# Patient Record
Sex: MALE | Race: WHITE | NOT HISPANIC OR LATINO | Employment: OTHER | URBAN - NONMETROPOLITAN AREA
[De-identification: names, ages, dates, MRNs, and addresses within clinical notes are randomized per-mention and may not be internally consistent; named-entity substitution may affect disease eponyms.]

---

## 2017-04-20 ENCOUNTER — OUTSIDE FACILITY SERVICE (OUTPATIENT)
Dept: CARDIOLOGY | Facility: CLINIC | Age: 57
End: 2017-04-20

## 2017-04-20 PROCEDURE — 99255 IP/OBS CONSLTJ NEW/EST HI 80: CPT | Performed by: INTERNAL MEDICINE

## 2017-04-20 PROCEDURE — 93306 TTE W/DOPPLER COMPLETE: CPT | Performed by: INTERNAL MEDICINE

## 2017-04-21 ENCOUNTER — OUTSIDE FACILITY SERVICE (OUTPATIENT)
Dept: CARDIOLOGY | Facility: CLINIC | Age: 57
End: 2017-04-21

## 2017-04-21 PROCEDURE — 93458 L HRT ARTERY/VENTRICLE ANGIO: CPT | Performed by: INTERNAL MEDICINE

## 2017-04-22 ENCOUNTER — OUTSIDE FACILITY SERVICE (OUTPATIENT)
Dept: CARDIOLOGY | Facility: CLINIC | Age: 57
End: 2017-04-22

## 2017-04-22 PROCEDURE — 99232 SBSQ HOSP IP/OBS MODERATE 35: CPT | Performed by: INTERNAL MEDICINE

## 2017-04-24 ENCOUNTER — TELEPHONE (OUTPATIENT)
Dept: CARDIOLOGY | Facility: CLINIC | Age: 57
End: 2017-04-24

## 2017-05-10 ENCOUNTER — OFFICE VISIT (OUTPATIENT)
Dept: CARDIOLOGY | Facility: CLINIC | Age: 57
End: 2017-05-10

## 2017-05-10 VITALS
HEIGHT: 72 IN | SYSTOLIC BLOOD PRESSURE: 140 MMHG | DIASTOLIC BLOOD PRESSURE: 98 MMHG | WEIGHT: 227 LBS | BODY MASS INDEX: 30.75 KG/M2 | HEART RATE: 64 BPM

## 2017-05-10 DIAGNOSIS — I25.9 IHD (ISCHEMIC HEART DISEASE): Primary | ICD-10-CM

## 2017-05-10 DIAGNOSIS — I10 ESSENTIAL HYPERTENSION: ICD-10-CM

## 2017-05-10 DIAGNOSIS — R06.02 SHORTNESS OF BREATH: ICD-10-CM

## 2017-05-10 DIAGNOSIS — M06.9 RHEUMATOID ARTHRITIS INVOLVING MULTIPLE SITES, UNSPECIFIED RHEUMATOID FACTOR PRESENCE: ICD-10-CM

## 2017-05-10 DIAGNOSIS — E78.00 HYPERCHOLESTEREMIA: ICD-10-CM

## 2017-05-10 DIAGNOSIS — E88.81 METABOLIC SYNDROME: ICD-10-CM

## 2017-05-10 PROCEDURE — 99214 OFFICE O/P EST MOD 30 MIN: CPT | Performed by: INTERNAL MEDICINE

## 2017-05-10 PROCEDURE — 93000 ELECTROCARDIOGRAM COMPLETE: CPT | Performed by: INTERNAL MEDICINE

## 2017-05-10 RX ORDER — PANTOPRAZOLE SODIUM 40 MG/1
40 TABLET, DELAYED RELEASE ORAL DAILY
COMMUNITY
End: 2018-04-12 | Stop reason: SDUPTHER

## 2017-05-10 RX ORDER — ATORVASTATIN CALCIUM 20 MG/1
20 TABLET, FILM COATED ORAL DAILY
COMMUNITY
End: 2017-05-10 | Stop reason: SDUPTHER

## 2017-05-10 RX ORDER — ATORVASTATIN CALCIUM 20 MG/1
20 TABLET, FILM COATED ORAL DAILY
Qty: 30 TABLET | Refills: 8 | Status: SHIPPED | OUTPATIENT
Start: 2017-05-10 | End: 2018-02-12 | Stop reason: SDUPTHER

## 2017-05-10 RX ORDER — ASPIRIN 81 MG/1
81 TABLET ORAL DAILY
COMMUNITY
End: 2017-05-16 | Stop reason: SDUPTHER

## 2017-05-10 RX ORDER — PRASUGREL 10 MG/1
10 TABLET, FILM COATED ORAL DAILY
Qty: 30 TABLET | Refills: 8 | Status: SHIPPED | OUTPATIENT
Start: 2017-05-10 | End: 2018-02-09 | Stop reason: SDUPTHER

## 2017-05-10 RX ORDER — DEXAMETHASONE 4 MG/1
4 TABLET ORAL 2 TIMES DAILY WITH MEALS
COMMUNITY
End: 2017-10-12

## 2017-05-10 RX ORDER — RANOLAZINE 500 MG/1
500 TABLET, EXTENDED RELEASE ORAL 2 TIMES DAILY
Qty: 30 TABLET | Refills: 1 | COMMUNITY
Start: 2017-05-10 | End: 2017-07-11 | Stop reason: HOSPADM

## 2017-05-10 RX ORDER — PRASUGREL 10 MG/1
10 TABLET, FILM COATED ORAL DAILY
COMMUNITY
End: 2017-05-10 | Stop reason: SDUPTHER

## 2017-05-16 ENCOUNTER — TELEPHONE (OUTPATIENT)
Dept: CARDIOLOGY | Facility: CLINIC | Age: 57
End: 2017-05-16

## 2017-05-16 RX ORDER — ASPIRIN 81 MG/1
81 TABLET ORAL DAILY
Qty: 90 TABLET | Refills: 0 | Status: SHIPPED | OUTPATIENT
Start: 2017-05-16 | End: 2017-08-14 | Stop reason: SDUPTHER

## 2017-05-19 ENCOUNTER — OUTSIDE FACILITY SERVICE (OUTPATIENT)
Dept: CARDIOLOGY | Facility: CLINIC | Age: 57
End: 2017-05-19

## 2017-05-19 PROCEDURE — 93458 L HRT ARTERY/VENTRICLE ANGIO: CPT | Performed by: INTERNAL MEDICINE

## 2017-05-20 ENCOUNTER — OUTSIDE FACILITY SERVICE (OUTPATIENT)
Dept: CARDIOLOGY | Facility: CLINIC | Age: 57
End: 2017-05-20

## 2017-05-20 PROCEDURE — 99217 PR OBSERVATION CARE DISCHARGE MANAGEMENT: CPT | Performed by: INTERNAL MEDICINE

## 2017-06-15 ENCOUNTER — TELEPHONE (OUTPATIENT)
Dept: CARDIOLOGY | Facility: CLINIC | Age: 57
End: 2017-06-15

## 2017-06-15 RX ORDER — LISINOPRIL 10 MG/1
10 TABLET ORAL DAILY
Qty: 30 TABLET | Refills: 11 | Status: SHIPPED | OUTPATIENT
Start: 2017-06-15 | End: 2018-04-12 | Stop reason: SDUPTHER

## 2017-06-15 NOTE — TELEPHONE ENCOUNTER
Received call from patient's spouse requesting refill on Lisinopril 10mg daily, states patient was started on this medication after heart cath in April and has been taking it daily, patient did not report taking this medication on last office visit and is not on med list, is it okay to refill? Thank you.

## 2017-06-26 ENCOUNTER — TELEPHONE (OUTPATIENT)
Dept: CARDIOLOGY | Facility: CLINIC | Age: 57
End: 2017-06-26

## 2017-06-26 DIAGNOSIS — I97.410 FEMORAL ARTERY HEMATOMA COMPLICATING CARDIAC CATHETERIZATION: Primary | ICD-10-CM

## 2017-06-26 NOTE — TELEPHONE ENCOUNTER
Pt's wife called, pt had cath 5/19/17, cath site had been fine until the past 2-3 days.  Now has reddened area about 3-4 inch diameter around cath site. Soreness noted. No drainage or fever.

## 2017-06-27 NOTE — TELEPHONE ENCOUNTER
Called pt, he said he wife had overreacted.  That the area looked much better today, that he had been sweating and felt like his clothes had rubbed the area. He didn't feel any further testing was needed.  Advised him to continue to moniter and contact us if he feels there is any problem. Will cancel US order for now.

## 2017-07-11 ENCOUNTER — OFFICE VISIT (OUTPATIENT)
Dept: CARDIOLOGY | Facility: CLINIC | Age: 57
End: 2017-07-11

## 2017-07-11 VITALS — WEIGHT: 242 LBS | HEIGHT: 72 IN | BODY MASS INDEX: 32.78 KG/M2

## 2017-07-11 DIAGNOSIS — R07.89 CHEST PRESSURE: ICD-10-CM

## 2017-07-11 DIAGNOSIS — I10 ESSENTIAL HYPERTENSION: ICD-10-CM

## 2017-07-11 DIAGNOSIS — E78.00 HYPERCHOLESTEREMIA: ICD-10-CM

## 2017-07-11 DIAGNOSIS — M05.79 RHEUMATOID ARTHRITIS INVOLVING MULTIPLE SITES WITH POSITIVE RHEUMATOID FACTOR (HCC): ICD-10-CM

## 2017-07-11 DIAGNOSIS — I25.9 IHD (ISCHEMIC HEART DISEASE): Primary | ICD-10-CM

## 2017-07-11 PROCEDURE — 99214 OFFICE O/P EST MOD 30 MIN: CPT | Performed by: INTERNAL MEDICINE

## 2017-07-11 RX ORDER — NEBIVOLOL 5 MG/1
TABLET ORAL
COMMUNITY
End: 2018-04-12 | Stop reason: SDUPTHER

## 2017-07-11 RX ORDER — OXYCODONE HYDROCHLORIDE AND ACETAMINOPHEN 5; 325 MG/1; MG/1
1 TABLET ORAL EVERY 6 HOURS PRN
COMMUNITY
End: 2017-10-12 | Stop reason: ALTCHOICE

## 2017-07-11 RX ORDER — GABAPENTIN 100 MG/1
CAPSULE ORAL
COMMUNITY
End: 2018-04-12 | Stop reason: ALTCHOICE

## 2017-07-11 NOTE — PROGRESS NOTES
Chief Complaint   Patient presents with   • Other     hospital follow-up, patient had heart Cath 5/19/17 with stenting to RAMUS x 3, patient recently diagnosed with shingles on chest   • Chest Pain     sharp burning pain, states does not know if it is his shingles or not   • Shortness of Breath     at times states may be related to shingles       CARDIAC COMPLAINTS  chest pressure/discomfort and fatigue        Subjective   Luis Miguel Haley is a 57 y.o. male came today for his follow-up visit.  He has history of ischemic heart disease diagnosed in April and underwent multiple stenting on almost all his coronaries.  He does have significant rheumatoid arthritis, with possible extra-articular involvement.  He apparently developed shingles last month, and is not able to take any of his rheumatoid medications.  He also is not on any antiviral medication.  The shingles happened on his chest, and his been having some chest pain which is burning in nature.  He is not sure whether this is cardiac related shingles related.  He doesn't have much rashes at this time.              Cardiac History  Past Surgical History:   Procedure Laterality Date   • CARDIAC CATHETERIZATION  04/21/2017    85% LAD- 2.5x30,2.5x30,2.5x22 Resolute. 85% RCA- 3.5x38,3.5x18,2.25x12 Resolute. 75% Ramus.   • CARDIAC CATHETERIZATION  05/19/2017    90% Ramus- 2.25x12,2.25x16,2.25x8 Promus   • CARDIOVASCULAR STRESS TEST  04/19/2017    @[facility]. R.Stress- 7 Min, 62% THR. BP- 180/80. Borderline test   • ECHO - CONVERTED  04/20/2017    @[facility]- EF 65%. AV nodular Thickenning       Current Outpatient Prescriptions   Medication Sig Dispense Refill   • aspirin 81 MG EC tablet Take 1 tablet by mouth Daily. 90 tablet 0   • atorvastatin (LIPITOR) 20 MG tablet Take 1 tablet by mouth Daily. 30 tablet 8   • dexamethasone (DECADRON) 4 MG tablet Take 4 mg by mouth 2 (Two) Times a Day With Meals.     • gabapentin (NEURONTIN) 100 MG capsule Take 100 mg by mouth 3 (Three) Times a  Day.     • lisinopril (PRINIVIL,ZESTRIL) 10 MG tablet Take 1 tablet by mouth Daily. 30 tablet 11   • nebivolol (BYSTOLIC) 5 MG tablet Take 1/2 tablet by mouth daily     • oxyCODONE-acetaminophen (PERCOCET) 5-325 MG per tablet Take 1 tablet by mouth Every 6 (Six) Hours As Needed.     • pantoprazole (PROTONIX) 40 MG EC tablet Take 40 mg by mouth Daily.     • prasugrel (EFFIENT) 10 MG tablet Take 1 tablet by mouth Daily. 30 tablet 8   • riTUXimab (RITUXAN) 100 MG/10ML solution injection intraveniously every 6 months       No current facility-administered medications for this visit.        Allergies  :  Methotrexate derivatives and Orencia [abatacept]       Past Medical History:   Diagnosis Date   • Coronary artery disease    • H/O hernia repair    • Heart murmur    • Hypercholesteremia 5/10/2017   • Rheumatoid arthritis        Social History     Social History   • Marital status:      Spouse name: N/A   • Number of children: N/A   • Years of education: N/A     Occupational History   • Not on file.     Social History Main Topics   • Smoking status: Former Smoker   • Smokeless tobacco: Never Used      Comment: quit 35 years ago   • Alcohol use Yes      Comment: rarely   • Drug use: No   • Sexual activity: Not on file     Other Topics Concern   • Not on file     Social History Narrative       Family History   Problem Relation Age of Onset   • Diabetes Mother    • Cancer Father    • Hypertension Sister        Review of Systems   Constitution: Positive for weakness and malaise/fatigue. Negative for decreased appetite.   HENT: Negative for congestion and sore throat.    Eyes: Negative for blurred vision.   Cardiovascular: Negative for dyspnea on exertion and palpitations.   Respiratory: Negative for shortness of breath and snoring.    Endocrine: Negative for cold intolerance and heat intolerance.   Hematologic/Lymphatic: Negative for adenopathy. Does not bruise/bleed easily.   Skin: Positive for rash. Negative for  "itching, nail changes and skin cancer.   Musculoskeletal: Positive for arthritis and myalgias.   Gastrointestinal: Negative for abdominal pain, dysphagia and heartburn.   Genitourinary: Negative for bladder incontinence and frequency.   Neurological: Negative for dizziness, light-headedness, seizures and vertigo.   Psychiatric/Behavioral: Negative for altered mental status.   Allergic/Immunologic: Negative for environmental allergies and hives.       Diabetes- No  Thyroid- normal    Objective     Ht 72\" (182.9 cm)  Wt 242 lb (110 kg)  BMI 32.82 kg/m2    Physical Exam   Constitutional: He is oriented to person, place, and time. He appears well-nourished.   HENT:   Head: Normocephalic.   Eyes: Pupils are equal, round, and reactive to light.   Neck: Normal range of motion.   Cardiovascular: Normal rate, regular rhythm, S1 normal and S2 normal.    Murmur heard.  Pulmonary/Chest: Breath sounds normal.   Abdominal: Soft. Bowel sounds are normal.   Musculoskeletal: Normal range of motion.   Neurological: He is alert and oriented to person, place, and time.   Skin: Skin is warm.   Psychiatric: He has a normal mood and affect.     Procedures            Assessment/Plan     Luis Miguel was seen today for other, chest pain and shortness of breath.    Diagnoses and all orders for this visit:    IHD (ischemic heart disease)  -     Stress Test With Myocardial Perfusion One Day; Future    Essential hypertension    Hypercholesteremia    Rheumatoid arthritis involving multiple sites with positive rheumatoid factor    Chest pressure  -     Stress Test With Myocardial Perfusion One Day; Future     His heart rate and blood pressure appears stable.  He has gained about 15 pounds.  This could be related to the prednisone intake.  I had a long talk with him about his weight gain.  At this time continue the same medications.  I'm going to do a Lexiscan Myoview to make sure that this is not ischemic cause for chest pain.  I advised him to talk " you regarding the shingles.  If the stress test shows significant ischemia, then we may have to repeat his cardiac catheterization.  Based on the results, further recommendations will be made.                    Electronically signed by Millie Rueda MD July 11, 2017 4:13 PM

## 2017-07-25 ENCOUNTER — HOSPITAL ENCOUNTER (OUTPATIENT)
Dept: CARDIOLOGY | Facility: HOSPITAL | Age: 57
Discharge: HOME | End: 2017-07-25
Attending: INTERNAL MEDICINE

## 2017-07-25 ENCOUNTER — OUTSIDE FACILITY SERVICE (OUTPATIENT)
Dept: CARDIOLOGY | Facility: CLINIC | Age: 57
End: 2017-07-25

## 2017-07-25 DIAGNOSIS — I25.9 IHD (ISCHEMIC HEART DISEASE): ICD-10-CM

## 2017-07-25 DIAGNOSIS — R07.89 CHEST PRESSURE: ICD-10-CM

## 2017-07-25 LAB
MAXIMAL PREDICTED HEART RATE: 164 BPM
STRESS TARGET HR: 139 BPM

## 2017-07-25 PROCEDURE — 25010000002 REGADENOSON 0.4 MG/5ML SOLUTION: Performed by: INTERNAL MEDICINE

## 2017-07-25 PROCEDURE — 93017 CV STRESS TEST TRACING ONLY: CPT

## 2017-07-25 PROCEDURE — 0 TECHNETIUM SESTAMIBI: Performed by: INTERNAL MEDICINE

## 2017-07-25 PROCEDURE — A9500 TC99M SESTAMIBI: HCPCS | Performed by: INTERNAL MEDICINE

## 2017-07-25 PROCEDURE — 78452 HT MUSCLE IMAGE SPECT MULT: CPT

## 2017-07-25 PROCEDURE — 93018 CV STRESS TEST I&R ONLY: CPT | Performed by: INTERNAL MEDICINE

## 2017-07-25 PROCEDURE — 78452 HT MUSCLE IMAGE SPECT MULT: CPT | Performed by: INTERNAL MEDICINE

## 2017-07-25 RX ADMIN — TECHNETIUM TC-99M SESTAMIBI 1 DOSE: 1 INJECTION INTRAVENOUS at 09:00

## 2017-07-25 RX ADMIN — REGADENOSON 0.4 MG: 0.08 INJECTION, SOLUTION INTRAVENOUS at 09:00

## 2017-07-27 ENCOUNTER — TELEPHONE (OUTPATIENT)
Dept: CARDIOLOGY | Facility: CLINIC | Age: 57
End: 2017-07-27

## 2017-07-27 NOTE — TELEPHONE ENCOUNTER
I spoke with patient's wife, Pham.  Aware of stress test results and recommendations.  No ischemia, and Continue home medications.

## 2017-08-14 RX ORDER — ASPIRIN 81 MG
TABLET, DELAYED RELEASE (ENTERIC COATED) ORAL
Qty: 30 TABLET | Refills: 0 | Status: SHIPPED | OUTPATIENT
Start: 2017-08-14 | End: 2017-11-09 | Stop reason: SDUPTHER

## 2017-10-12 ENCOUNTER — OFFICE VISIT (OUTPATIENT)
Dept: CARDIOLOGY | Facility: CLINIC | Age: 57
End: 2017-10-12

## 2017-10-12 VITALS
HEART RATE: 68 BPM | BODY MASS INDEX: 33.05 KG/M2 | SYSTOLIC BLOOD PRESSURE: 120 MMHG | HEIGHT: 72 IN | DIASTOLIC BLOOD PRESSURE: 80 MMHG | WEIGHT: 244 LBS

## 2017-10-12 DIAGNOSIS — G62.9 NEUROPATHY: ICD-10-CM

## 2017-10-12 DIAGNOSIS — I25.9 IHD (ISCHEMIC HEART DISEASE): Primary | ICD-10-CM

## 2017-10-12 DIAGNOSIS — I10 ESSENTIAL HYPERTENSION: ICD-10-CM

## 2017-10-12 DIAGNOSIS — R06.02 SHORTNESS OF BREATH: ICD-10-CM

## 2017-10-12 DIAGNOSIS — M05.79 RHEUMATOID ARTHRITIS INVOLVING MULTIPLE SITES WITH POSITIVE RHEUMATOID FACTOR (HCC): ICD-10-CM

## 2017-10-12 DIAGNOSIS — R07.89 CHEST PRESSURE: ICD-10-CM

## 2017-10-12 DIAGNOSIS — E78.00 HYPERCHOLESTEREMIA: ICD-10-CM

## 2017-10-12 PROCEDURE — 99213 OFFICE O/P EST LOW 20 MIN: CPT | Performed by: INTERNAL MEDICINE

## 2017-10-12 RX ORDER — NITROGLYCERIN 0.4 MG/1
TABLET SUBLINGUAL
Qty: 100 TABLET | Refills: 11 | Status: SHIPPED | OUTPATIENT
Start: 2017-10-12 | End: 2018-04-12 | Stop reason: SDUPTHER

## 2017-10-12 RX ORDER — FUROSEMIDE 20 MG/1
20 TABLET ORAL DAILY
COMMUNITY
End: 2018-04-12 | Stop reason: SDUPTHER

## 2017-10-12 RX ORDER — CHOLECALCIFEROL (VITAMIN D3) 125 MCG
1 CAPSULE ORAL DAILY
COMMUNITY

## 2017-10-12 RX ORDER — POTASSIUM CHLORIDE 600 MG/1
8 CAPSULE, EXTENDED RELEASE ORAL DAILY
COMMUNITY
End: 2018-04-12 | Stop reason: SDUPTHER

## 2017-10-12 RX ORDER — LANOLIN ALCOHOL/MO/W.PET/CERES
1000 CREAM (GRAM) TOPICAL DAILY
COMMUNITY

## 2017-10-12 NOTE — PROGRESS NOTES
Chief Complaint   Patient presents with   • Follow-up     cardiac management, labs per PCP, patient brought medication's with visit.    • Shortness of Breath     with exertion       CARDIAC COMPLAINTS  chest pressure/discomfort and dyspnea        Subjective   Luis Miguel Haley is a 57 y.o. male came in today for his follow-up visit .  He has hypertension , hypercholesterolemia and severe rheumatoid arthritis .  He was diagnosed with ischemic heart disease in April of this year when he underwent multiple stenting of the LAD and RCA .  Then about a month later he underwent stenting of the ramus.  He unfortunately developed shingles and has some chest pain also.  It was difficult to determine which one his musculoskeletal and which one his cardiac.  He underwent Lexiscan Myoview and found to have mostly and small inferior infarct within normal flow in other segments.  He has been managed medically.  He came today stating that recently he had a CT scan because of increasing shortness of breath .  According to him all of the workup was negative .  His chest pain is little better .  But is getting neuropathy after the shingles .  He now takes Neurontin for the same .  He did have lab work done at your office few days ago , but I don't have the results.                 Cardiac History  Past Surgical History:   Procedure Laterality Date   • CARDIAC CATHETERIZATION  04/21/2017    85% LAD- 2.5x30,2.5x30,2.5x22 Resolute. 85% RCA- 3.5x38,3.5x18,2.25x12 Resolute. 75% Ramus.   • CARDIAC CATHETERIZATION  05/19/2017    90% Ramus- 2.25x12,2.25x16,2.25x8 Promus   • CARDIOVASCULAR STRESS TEST  04/19/2017    @[facility]. R.Stress- 7 Min, 62% THR. BP- 180/80. Borderline test   • CARDIOVASCULAR STRESS TEST  07/25/2017    L.Myoview- EF 50%. Inferior Infarct   • ECHO - CONVERTED  04/20/2017    @[facility]- EF 65%. AV nodular Thickenning       Current Outpatient Prescriptions   Medication Sig Dispense Refill   • ASPIRIN LOW DOSE 81 MG EC tablet TAKE ONE  TABLET BY MOUTH DAILY 30 tablet 0   • atorvastatin (LIPITOR) 20 MG tablet Take 1 tablet by mouth Daily. 30 tablet 8   • Cholecalciferol (VITAMIN D3) 2000 units tablet Take 1 tablet by mouth Daily.     • furosemide (LASIX) 20 MG tablet Take 20 mg by mouth Daily.     • gabapentin (NEURONTIN) 100 MG capsule Takes 1 to 3 capsules by mouth 3 times a day     • lisinopril (PRINIVIL,ZESTRIL) 10 MG tablet Take 1 tablet by mouth Daily. 30 tablet 11   • nebivolol (BYSTOLIC) 5 MG tablet Take 1/2 tablet by mouth daily     • pantoprazole (PROTONIX) 40 MG EC tablet Take 40 mg by mouth Daily.     • potassium chloride (MICRO-K) 8 MEQ CR capsule Take 8 mEq by mouth Daily.     • prasugrel (EFFIENT) 10 MG tablet Take 1 tablet by mouth Daily. 30 tablet 8   • riTUXimab (RITUXAN) 100 MG/10ML solution injection intraveniously every 6 months     • vitamin B-12 (CYANOCOBALAMIN) 1000 MCG tablet Take 1,000 mcg by mouth Daily.     • nitroglycerin (NITROSTAT) 0.4 MG SL tablet 1 under the tongue as needed for angina, may repeat q5mins for up three doses 100 tablet 11     No current facility-administered medications for this visit.        Allergies  :  Methotrexate derivatives and Orencia [abatacept]       Past Medical History:   Diagnosis Date   • Coronary artery disease    • H/O hernia repair    • Heart murmur    • Hypercholesteremia 5/10/2017   • Rheumatoid arthritis        Social History     Social History   • Marital status:      Spouse name: N/A   • Number of children: N/A   • Years of education: N/A     Occupational History   • Not on file.     Social History Main Topics   • Smoking status: Former Smoker   • Smokeless tobacco: Never Used      Comment: quit 35 years ago   • Alcohol use Yes      Comment: rarely   • Drug use: No   • Sexual activity: Not on file     Other Topics Concern   • Not on file     Social History Narrative       Family History   Problem Relation Age of Onset   • Diabetes Mother    • Cancer Father    •  "Hypertension Sister        Review of Systems   Constitution: Positive for weakness. Negative for decreased appetite and malaise/fatigue.   HENT: Negative for congestion and sore throat.    Eyes: Negative for blurred vision.   Cardiovascular: Positive for dyspnea on exertion. Negative for chest pain and palpitations.   Respiratory: Positive for shortness of breath. Negative for snoring.    Endocrine: Negative for cold intolerance and heat intolerance.   Hematologic/Lymphatic: Negative for adenopathy. Does not bruise/bleed easily.   Skin: Negative for itching, nail changes and skin cancer.   Musculoskeletal: Positive for arthritis, joint pain, myalgias and neck pain.   Gastrointestinal: Negative for abdominal pain, dysphagia and heartburn.   Genitourinary: Negative for bladder incontinence and frequency.   Neurological: Negative for dizziness, light-headedness, seizures and vertigo.   Psychiatric/Behavioral: Negative for altered mental status.   Allergic/Immunologic: Negative for environmental allergies and hives.       Diabetes- No  Thyroid- normal    Objective     /80 (BP Location: Left arm)  Pulse 68  Ht 72\" (182.9 cm)  Wt 244 lb (111 kg)  BMI 33.09 kg/m2    Physical Exam   Constitutional: He is oriented to person, place, and time. He appears well-nourished.   HENT:   Head: Normocephalic.   Eyes: Pupils are equal, round, and reactive to light.   Neck: Normal range of motion.   Cardiovascular: Normal rate, regular rhythm, S1 normal and S2 normal.    Murmur heard.  Pulmonary/Chest: Effort normal and breath sounds normal.   Abdominal: Soft. Bowel sounds are normal.   Musculoskeletal: Normal range of motion.   Neurological: He is alert and oriented to person, place, and time.   Skin: Skin is warm.   Psychiatric: He has a normal mood and affect.     Procedures            Assessment/Plan     Luis Miguel was seen today for follow-up and shortness of breath.    Diagnoses and all orders for this visit:    IHD " (ischemic heart disease)  -     nitroglycerin (NITROSTAT) 0.4 MG SL tablet; 1 under the tongue as needed for angina, may repeat q5mins for up three doses    Essential hypertension    Hypercholesteremia    Shortness of breath    Chest pressure    Rheumatoid arthritis involving multiple sites with positive rheumatoid factor    Neuropathy       His heart rate and blood pressure at baseline appears normal . At this time continue the same medication .I did explain to him and his wife about the stress findings .  Continue the same .he needs to take Effient till next May .  He needs to continue his lipid-lowering medications .Regarding the dosage , he is advised to talk to you since I don't have the lab results.  I will really appreciate , if you can send me copy of his labs.  Meanwhile , I advised him to keep sublingual nitroglycerin with him all the time and wrote a prescription for the same .                  Electronically signed by Millie Rueda MD October 12, 2017 4:18 PM

## 2017-11-09 RX ORDER — ASPIRIN 81 MG
TABLET, DELAYED RELEASE (ENTERIC COATED) ORAL
Qty: 30 TABLET | Refills: 11 | Status: SHIPPED | OUTPATIENT
Start: 2017-11-09 | End: 2018-04-12 | Stop reason: SDUPTHER

## 2018-02-09 DIAGNOSIS — I25.9 IHD (ISCHEMIC HEART DISEASE): ICD-10-CM

## 2018-02-09 RX ORDER — PRASUGREL 10 MG/1
TABLET, FILM COATED ORAL
Qty: 30 TABLET | Refills: 11 | Status: SHIPPED | OUTPATIENT
Start: 2018-02-09 | End: 2018-04-12 | Stop reason: SDUPTHER

## 2018-02-12 DIAGNOSIS — E78.00 HYPERCHOLESTEREMIA: ICD-10-CM

## 2018-02-12 RX ORDER — ATORVASTATIN CALCIUM 20 MG/1
TABLET, FILM COATED ORAL
Qty: 30 TABLET | Refills: 7 | Status: SHIPPED | OUTPATIENT
Start: 2018-02-12 | End: 2018-04-12 | Stop reason: SDUPTHER

## 2018-04-12 ENCOUNTER — OFFICE VISIT (OUTPATIENT)
Dept: CARDIOLOGY | Facility: CLINIC | Age: 58
End: 2018-04-12

## 2018-04-12 ENCOUNTER — TELEPHONE (OUTPATIENT)
Dept: CARDIOLOGY | Facility: CLINIC | Age: 58
End: 2018-04-12

## 2018-04-12 VITALS
WEIGHT: 239 LBS | HEART RATE: 60 BPM | SYSTOLIC BLOOD PRESSURE: 116 MMHG | HEIGHT: 72 IN | DIASTOLIC BLOOD PRESSURE: 80 MMHG | BODY MASS INDEX: 32.37 KG/M2

## 2018-04-12 DIAGNOSIS — I25.9 IHD (ISCHEMIC HEART DISEASE): ICD-10-CM

## 2018-04-12 DIAGNOSIS — I10 ESSENTIAL HYPERTENSION: ICD-10-CM

## 2018-04-12 DIAGNOSIS — G62.9 NEUROPATHY: ICD-10-CM

## 2018-04-12 DIAGNOSIS — M05.79 RHEUMATOID ARTHRITIS INVOLVING MULTIPLE SITES WITH POSITIVE RHEUMATOID FACTOR (HCC): ICD-10-CM

## 2018-04-12 DIAGNOSIS — I25.9 IHD (ISCHEMIC HEART DISEASE): Primary | ICD-10-CM

## 2018-04-12 DIAGNOSIS — E78.00 HYPERCHOLESTEREMIA: ICD-10-CM

## 2018-04-12 PROCEDURE — 99213 OFFICE O/P EST LOW 20 MIN: CPT | Performed by: INTERNAL MEDICINE

## 2018-04-12 RX ORDER — NEBIVOLOL 5 MG/1
5 TABLET ORAL DAILY
Qty: 30 TABLET | Refills: 8 | Status: SHIPPED | OUTPATIENT
Start: 2018-04-12 | End: 2018-04-12 | Stop reason: SDUPTHER

## 2018-04-12 RX ORDER — NITROGLYCERIN 0.4 MG/1
TABLET SUBLINGUAL
Qty: 100 TABLET | Refills: 11 | Status: SHIPPED | OUTPATIENT
Start: 2018-04-12 | End: 2018-10-11 | Stop reason: SDUPTHER

## 2018-04-12 RX ORDER — ATORVASTATIN CALCIUM 20 MG/1
20 TABLET, FILM COATED ORAL DAILY
Qty: 30 TABLET | Refills: 7 | Status: SHIPPED | OUTPATIENT
Start: 2018-04-12 | End: 2018-10-11 | Stop reason: SDUPTHER

## 2018-04-12 RX ORDER — FUROSEMIDE 20 MG/1
20 TABLET ORAL DAILY
Qty: 30 TABLET | Refills: 8 | Status: SHIPPED | OUTPATIENT
Start: 2018-04-12 | End: 2018-10-11 | Stop reason: SDUPTHER

## 2018-04-12 RX ORDER — PANTOPRAZOLE SODIUM 40 MG/1
40 TABLET, DELAYED RELEASE ORAL DAILY
Qty: 30 TABLET | Refills: 8 | Status: SHIPPED | OUTPATIENT
Start: 2018-04-12

## 2018-04-12 RX ORDER — LISINOPRIL 10 MG/1
10 TABLET ORAL DAILY
Qty: 30 TABLET | Refills: 11 | Status: SHIPPED | OUTPATIENT
Start: 2018-04-12 | End: 2018-10-11 | Stop reason: SDUPTHER

## 2018-04-12 RX ORDER — ASPIRIN 81 MG/1
81 TABLET ORAL DAILY
Qty: 30 TABLET | Refills: 11 | Status: SHIPPED | OUTPATIENT
Start: 2018-04-12 | End: 2019-05-15 | Stop reason: SDUPTHER

## 2018-04-12 RX ORDER — PREGABALIN 150 MG/1
150 CAPSULE ORAL 3 TIMES DAILY
COMMUNITY
End: 2022-07-26

## 2018-04-12 RX ORDER — PRASUGREL 10 MG/1
10 TABLET, FILM COATED ORAL DAILY
Qty: 30 TABLET | Refills: 11 | Status: SHIPPED | OUTPATIENT
Start: 2018-04-12 | End: 2018-10-11 | Stop reason: SDUPTHER

## 2018-04-12 RX ORDER — POTASSIUM CHLORIDE 600 MG/1
8 CAPSULE, EXTENDED RELEASE ORAL DAILY
Qty: 30 EACH | Refills: 8 | Status: SHIPPED | OUTPATIENT
Start: 2018-04-12 | End: 2019-04-11 | Stop reason: SDUPTHER

## 2018-04-12 RX ORDER — NEBIVOLOL 5 MG/1
TABLET ORAL
Qty: 30 TABLET | Refills: 8 | Status: SHIPPED | OUTPATIENT
Start: 2018-04-12 | End: 2018-05-14 | Stop reason: SDUPTHER

## 2018-04-12 NOTE — TELEPHONE ENCOUNTER
Pharmacy called requesting clarification on Bystolic script. Clarification of Bystolic 5mg 1/2 tablet once daily sent to pharmacy.

## 2018-04-12 NOTE — PROGRESS NOTES
Chief Complaint   Patient presents with   • Follow-up     for cardiac management   • Med Refill     refills needed on cardiac meds, 30 days to Sid   • Herpes Zoster     still having a lot of pain from the shingles, taking LYrica, seems to help some.        CARDIAC COMPLAINTS  Back pain        Subjective   Luis Miguel Haley is a 58 y.o. male given today for his follow-up visit.  He has history of hypertension, rheumatoid arthritis was diagnosed with ischemic heart disease in April 2017 and underwent numerous stenting of LAD, RCA and was brought back and underwent stenting of his ramus.  His stress test done after that showed no ischemia.  He unfortunately developed shingles and is still having problems with postherpetic neuralgia.  His lab work is followed at your office and at the rheumatologist's office.  He is getting Rituxan every 6 months.  He denies having any new cardiac symptoms.              Cardiac History  Past Surgical History:   Procedure Laterality Date   • CARDIAC CATHETERIZATION  04/21/2017    85% LAD- 2.5x30,2.5x30,2.5x22 Resolute. 85% RCA- 3.5x38,3.5x18,2.25x12 Resolute. 75% Ramus.   • CARDIAC CATHETERIZATION  05/19/2017    90% Ramus- 2.25x12,2.25x16,2.25x8 Promus   • CARDIOVASCULAR STRESS TEST  04/19/2017    @[facility]. R.Stress- 7 Min, 62% THR. BP- 180/80. Borderline test   • CARDIOVASCULAR STRESS TEST  07/25/2017    L.Myoview- EF 50%. Inferior Infarct   • ECHO - CONVERTED  04/20/2017    @[facility]- EF 65%. AV nodular Thickenning       Current Outpatient Prescriptions   Medication Sig Dispense Refill   • aspirin (ASPIRIN LOW DOSE) 81 MG EC tablet Take 1 tablet by mouth Daily. 30 tablet 11   • atorvastatin (LIPITOR) 20 MG tablet Take 1 tablet by mouth Daily. 30 tablet 7   • Cholecalciferol (VITAMIN D3) 2000 units tablet Take 1 tablet by mouth Daily.     • furosemide (LASIX) 20 MG tablet Take 1 tablet by mouth Daily. 30 tablet 8   • lisinopril (PRINIVIL,ZESTRIL) 10 MG tablet Take 1 tablet by mouth Daily. 30  tablet 11   • nebivolol (BYSTOLIC) 5 MG tablet Take 1 tablet by mouth Daily. Take 1/2 tablet by mouth daily 30 tablet 8   • nitroglycerin (NITROSTAT) 0.4 MG SL tablet 1 under the tongue as needed for angina, may repeat q5mins for up three doses 100 tablet 11   • pantoprazole (PROTONIX) 40 MG EC tablet Take 1 tablet by mouth Daily. 30 tablet 8   • potassium chloride (MICRO-K) 8 MEQ CR capsule Take 1 capsule by mouth Daily. 30 each 8   • prasugrel (EFFIENT) 10 MG tablet Take 1 tablet by mouth Daily. 30 tablet 11   • pregabalin (LYRICA) 150 MG capsule Take 150 mg by mouth 2 (Two) Times a Day.     • riTUXimab (RITUXAN) 100 MG/10ML solution injection intraveniously every 6 months     • vitamin B-12 (CYANOCOBALAMIN) 1000 MCG tablet Take 1,000 mcg by mouth Daily.       No current facility-administered medications for this visit.        Allergies  :  Methotrexate derivatives and Orencia [abatacept]       Past Medical History:   Diagnosis Date   • Coronary artery disease    • H/O hernia repair    • Heart murmur    • Hypercholesteremia 5/10/2017   • Rheumatoid arthritis        Social History     Social History   • Marital status:      Spouse name: N/A   • Number of children: N/A   • Years of education: N/A     Occupational History   • Not on file.     Social History Main Topics   • Smoking status: Former Smoker   • Smokeless tobacco: Never Used      Comment: quit 35 years ago   • Alcohol use Yes      Comment: rarely   • Drug use: No   • Sexual activity: Not on file     Other Topics Concern   • Not on file     Social History Narrative   • No narrative on file       Family History   Problem Relation Age of Onset   • Diabetes Mother    • Cancer Father    • Hypertension Sister        Review of Systems   Constitution: Positive for malaise/fatigue. Negative for decreased appetite.   HENT: Negative for congestion and sore throat.    Eyes: Negative for blurred vision.   Cardiovascular: Positive for dyspnea on exertion.  "Negative for chest pain.   Respiratory: Positive for shortness of breath. Negative for snoring.    Endocrine: Negative for cold intolerance and heat intolerance.   Hematologic/Lymphatic: Negative for adenopathy. Does not bruise/bleed easily.   Skin: Negative for itching, nail changes and skin cancer.   Musculoskeletal: Positive for arthritis and back pain. Negative for myalgias.   Gastrointestinal: Negative for abdominal pain, dysphagia and heartburn.   Genitourinary: Negative for bladder incontinence and frequency.   Neurological: Negative for dizziness, light-headedness, seizures and vertigo.   Psychiatric/Behavioral: Negative for altered mental status.   Allergic/Immunologic: Negative for environmental allergies and hives.       Diabetes- No  Thyroid- normal    Objective     /80   Pulse 60   Ht 182.9 cm (72\")   Wt 108 kg (239 lb)   BMI 32.41 kg/m²     Physical Exam   Constitutional: He is oriented to person, place, and time. He appears well-developed and well-nourished.   HENT:   Head: Normocephalic.   Nose: Nose normal.   Eyes: EOM are normal. Pupils are equal, round, and reactive to light.   Neck: Normal range of motion. Neck supple.   Cardiovascular: Normal rate, regular rhythm, S1 normal and S2 normal.    Murmur heard.  Pulmonary/Chest: Effort normal and breath sounds normal.   Abdominal: Soft. Bowel sounds are normal.   Musculoskeletal: Normal range of motion. He exhibits no edema.   Neurological: He is alert and oriented to person, place, and time.   Skin: Skin is warm and dry.   Psychiatric: He has a normal mood and affect.     Procedures            Assessment/Plan     Luis Miguel was seen today for follow-up, med refill and herpes zoster.    Diagnoses and all orders for this visit:    IHD (ischemic heart disease)  -     prasugrel (EFFIENT) 10 MG tablet; Take 1 tablet by mouth Daily.  -     nitroglycerin (NITROSTAT) 0.4 MG SL tablet; 1 under the tongue as needed for angina, may repeat q5mins for up " three doses  -     nebivolol (BYSTOLIC) 5 MG tablet; Take 1 tablet by mouth Daily. Take 1/2 tablet by mouth daily  -     aspirin (ASPIRIN LOW DOSE) 81 MG EC tablet; Take 1 tablet by mouth Daily.    Essential hypertension  -     nebivolol (BYSTOLIC) 5 MG tablet; Take 1 tablet by mouth Daily. Take 1/2 tablet by mouth daily  -     lisinopril (PRINIVIL,ZESTRIL) 10 MG tablet; Take 1 tablet by mouth Daily.    Hypercholesteremia  -     atorvastatin (LIPITOR) 20 MG tablet; Take 1 tablet by mouth Daily.    Rheumatoid arthritis involving multiple sites with positive rheumatoid factor    Neuropathy    Other orders  -     pantoprazole (PROTONIX) 40 MG EC tablet; Take 1 tablet by mouth Daily.  -     furosemide (LASIX) 20 MG tablet; Take 1 tablet by mouth Daily.  -     potassium chloride (MICRO-K) 8 MEQ CR capsule; Take 1 capsule by mouth Daily.       At baseline his heart rate and blood pressure appears stable.  His BMI is still around 32.  I had a long talk with him about the diet and exercise program.  I also talked to him about what can of food he can eat to help reduce weight.  Prescription for most of his medications was given.  I'll really appreciate, if you can send me  copy of his labs.                  Electronically signed by Millie Rueda MD April 12, 2018 4:32 PM

## 2018-05-14 ENCOUNTER — TELEPHONE (OUTPATIENT)
Dept: CARDIOLOGY | Facility: CLINIC | Age: 58
End: 2018-05-14

## 2018-05-14 DIAGNOSIS — I25.9 IHD (ISCHEMIC HEART DISEASE): ICD-10-CM

## 2018-05-14 DIAGNOSIS — I10 ESSENTIAL HYPERTENSION: ICD-10-CM

## 2018-05-14 RX ORDER — NEBIVOLOL 5 MG/1
5 TABLET ORAL DAILY
Qty: 30 TABLET | Refills: 8 | Status: SHIPPED | OUTPATIENT
Start: 2018-05-14 | End: 2018-10-11 | Stop reason: SDUPTHER

## 2018-05-14 NOTE — TELEPHONE ENCOUNTER
Tried calling wife to advise of recommendation. No answer. Script with new directions sent to pharmacy.

## 2018-05-14 NOTE — TELEPHONE ENCOUNTER
Patient's wife called and states that they got the prescription of bystolic that was sent in at his last visit. Prescription has directions of take 1/2 tab daily but states that he has always taken a whole tablet daily. Is it okay to send in script for a whole tablet daily?

## 2018-10-11 ENCOUNTER — OFFICE VISIT (OUTPATIENT)
Dept: CARDIOLOGY | Facility: CLINIC | Age: 58
End: 2018-10-11

## 2018-10-11 VITALS
BODY MASS INDEX: 33.05 KG/M2 | WEIGHT: 244 LBS | HEIGHT: 72 IN | SYSTOLIC BLOOD PRESSURE: 122 MMHG | DIASTOLIC BLOOD PRESSURE: 80 MMHG | HEART RATE: 60 BPM

## 2018-10-11 DIAGNOSIS — R06.02 SHORTNESS OF BREATH: ICD-10-CM

## 2018-10-11 DIAGNOSIS — E78.00 HYPERCHOLESTEREMIA: ICD-10-CM

## 2018-10-11 DIAGNOSIS — M05.79 RHEUMATOID ARTHRITIS INVOLVING MULTIPLE SITES WITH POSITIVE RHEUMATOID FACTOR (HCC): ICD-10-CM

## 2018-10-11 DIAGNOSIS — I25.9 IHD (ISCHEMIC HEART DISEASE): Primary | ICD-10-CM

## 2018-10-11 DIAGNOSIS — E88.81 METABOLIC SYNDROME: ICD-10-CM

## 2018-10-11 DIAGNOSIS — I10 ESSENTIAL HYPERTENSION: ICD-10-CM

## 2018-10-11 DIAGNOSIS — I25.6 SILENT MYOCARDIAL ISCHEMIA: ICD-10-CM

## 2018-10-11 PROBLEM — M06.9 RHEUMATOID ARTHRITIS INVOLVING MULTIPLE SITES: Status: RESOLVED | Noted: 2017-05-10 | Resolved: 2018-10-11

## 2018-10-11 PROBLEM — E88.810 METABOLIC SYNDROME: Status: ACTIVE | Noted: 2018-10-11

## 2018-10-11 PROCEDURE — 99214 OFFICE O/P EST MOD 30 MIN: CPT | Performed by: INTERNAL MEDICINE

## 2018-10-11 RX ORDER — NITROGLYCERIN 0.4 MG/1
TABLET SUBLINGUAL
Qty: 100 TABLET | Refills: 11 | Status: SHIPPED | OUTPATIENT
Start: 2018-10-11 | End: 2020-03-02

## 2018-10-11 RX ORDER — FUROSEMIDE 20 MG/1
20 TABLET ORAL DAILY
Qty: 30 TABLET | Refills: 8 | Status: SHIPPED | OUTPATIENT
Start: 2018-10-11 | End: 2019-04-11 | Stop reason: SDUPTHER

## 2018-10-11 RX ORDER — NEBIVOLOL 5 MG/1
5 TABLET ORAL DAILY
Qty: 30 TABLET | Refills: 8 | Status: SHIPPED | OUTPATIENT
Start: 2018-10-11 | End: 2019-04-11 | Stop reason: SDUPTHER

## 2018-10-11 RX ORDER — LISINOPRIL 10 MG/1
10 TABLET ORAL DAILY
Qty: 30 TABLET | Refills: 11 | Status: SHIPPED | OUTPATIENT
Start: 2018-10-11 | End: 2019-04-11 | Stop reason: SDUPTHER

## 2018-10-11 RX ORDER — ATORVASTATIN CALCIUM 20 MG/1
20 TABLET, FILM COATED ORAL DAILY
Qty: 30 TABLET | Refills: 7 | Status: SHIPPED | OUTPATIENT
Start: 2018-10-11 | End: 2019-04-11 | Stop reason: SDUPTHER

## 2018-10-11 RX ORDER — PRASUGREL 10 MG/1
10 TABLET, FILM COATED ORAL DAILY
Qty: 30 TABLET | Refills: 11 | Status: SHIPPED | OUTPATIENT
Start: 2018-10-11 | End: 2019-04-11 | Stop reason: SDUPTHER

## 2018-10-11 NOTE — PATIENT INSTRUCTIONS
Mediterranean Diet  A Mediterranean diet refers to food and lifestyle choices that are based on the traditions of countries located on the [place]. This way of eating has been shown to help prevent certain conditions and improve outcomes for people who have chronic diseases, like kidney disease and heart disease.  What are tips for following this plan?  Lifestyle  · Cook and eat meals together with your family, when possible.  · Drink enough fluid to keep your urine clear or pale yellow.  · Be physically active every day. This includes:  ? Aerobic exercise like running or swimming.  ? Leisure activities like gardening, walking, or housework.  · Get 7-8 hours of sleep each night.  · If recommended by your health care provider, drink red wine in moderation. This means 1 glass a day for nonpregnant women and 2 glasses a day for men. A glass of wine equals 5 oz (150 mL).  Reading food labels  · Check the serving size of packaged foods. For foods such as rice and pasta, the serving size refers to the amount of cooked product, not dry.  · Check the total fat in packaged foods. Avoid foods that have saturated fat or trans fats.  · Check the ingredients list for added sugars, such as corn syrup.  Shopping  · At the grocery store, buy most of your food from the areas near the walls of the store. This includes:  ? Fresh fruits and vegetables (produce).  ? Grains, beans, nuts, and seeds. Some of these may be available in unpackaged forms or large amounts (in bulk).  ? Fresh seafood.  ? Poultry and eggs.  ? Low-fat dairy products.  · Buy whole ingredients instead of prepackaged foods.  · Buy fresh fruits and vegetables in-season from local farmers markets.  · Buy frozen fruits and vegetables in resealable bags.  · If you do not have access to quality fresh seafood, buy precooked frozen shrimp or canned fish, such as tuna, salmon, or sardines.  · Buy small amounts of raw or cooked vegetables, salads, or olives from the  deli or salad bar at your store.  · Stock your pantry so you always have certain foods on hand, such as olive oil, canned tuna, canned tomatoes, rice, pasta, and beans.  Cooking  · Cook foods with extra-virgin olive oil instead of using butter or other vegetable oils.  · Have meat as a side dish, and have vegetables or grains as your main dish. This means having meat in small portions or adding small amounts of meat to foods like pasta or stew.  · Use beans or vegetables instead of meat in common dishes like chili or lasagna.  · [place] with different cooking methods. Try roasting or broiling vegetables instead of steaming or sautéeing them.  · Add frozen vegetables to soups, stews, pasta, or rice.  · Add nuts or seeds for added healthy fat at each meal. You can add these to yogurt, salads, or vegetable dishes.  · Marinate fish or vegetables using olive oil, lemon juice, garlic, and fresh herbs.  Meal planning  · Plan to eat 1 vegetarian meal one day each week. Try to work up to 2 vegetarian meals, if possible.  · Eat seafood 2 or more times a week.  · Have healthy snacks readily available, such as:  ? Vegetable sticks with hummus.  ? Greek yogurt.  ? Fruit and nut trail mix.  · Eat balanced meals throughout the week. This includes:  ? Fruit: 2-3 servings a day  ? Vegetables: 4-5 servings a day  ? Low-fat dairy: 2 servings a day  ? Fish, poultry, or lean meat: 1 serving a day  ? Beans and legumes: 2 or more servings a week  ? Nuts and seeds: 1-2 servings a day  ? Whole grains: 6-8 servings a day  ? Extra-virgin olive oil: 3-4 servings a day  · Limit red meat and sweets to only a few servings a month  What are my food choices?  · Mediterranean diet  ? Recommended  ? Grains: Whole-grain pasta. Brown rice. Bulgar wheat. Polenta. Couscous. Whole-wheat bread. Oatmeal. Quinoa.  ? Vegetables: Artichokes. Beets. Broccoli. Cabbage. Carrots. Eggplant. Green beans. Chard. Kale. Spinach. Onions. Leeks. Peas. Squash.  Tomatoes. Peppers. Radishes.  ? Fruits: Apples. Apricots. Avocado. Berries. Bananas. Cherries. Dates. Figs. Grapes. Juan. Melon. Oranges. Peaches. Plums. Pomegranate.  ? Meats and other protein foods: Beans. Almonds. Sunflower seeds. Pine nuts. Peanuts. Cod. [place]. Scallops. Shrimp. Tuna. Tilapia. Clams. Oysters. Eggs.  ? Dairy: Low-fat milk. Cheese. Greek yogurt.  ? Beverages: Water. Red wine. Herbal tea.  ? Fats and oils: Extra virgin olive oil. Avocado oil. Grape seed oil.  ? Sweets and desserts: Greek yogurt with honey. Baked apples. Poached pears. Trail mix.  ? Seasoning and other foods: Basil. Cilantro. Coriander. Cumin. Mint. Parsley. Misha. Rosemary. Tarragon. Garlic. Oregano. Thyme. Pepper. Balsalmic vinegar. Tahini. Hummus. Tomato sauce. Olives. Mushrooms.  ? Limit these  ? Grains: Prepackaged pasta or rice dishes. Prepackaged cereal with added sugar.  ? Vegetables: Deep fried potatoes (french fries).  ? Fruits: Fruit canned in syrup.  ? Meats and other protein foods: Beef. Pork. Lamb. Poultry with skin. Hot dogs. [place].  ? Dairy: Ice cream. Sour cream. Whole milk.  ? Beverages: Juice. Sugar-sweetened soft drinks. Beer. Liquor and spirits.  ? Fats and oils: Butter. Canola oil. Vegetable oil. Beef fat (tallow). Lard.  ? Sweets and desserts: Cookies. Cakes. Pies. Candy.  ? Seasoning and other foods: Mayonnaise. Premade sauces and marinades.  ? The items listed may not be a complete list. Talk with your dietitian about what dietary choices are right for you.  Summary  · The Mediterranean diet includes both food and lifestyle choices.  · Eat a variety of fresh fruits and vegetables, beans, nuts, seeds, and whole grains.  · Limit the amount of red meat and sweets that you eat.  · Talk with your health care provider about whether it is safe for you to drink red wine in moderation. This means 1 glass a day for nonpregnant women and 2 glasses a day for men. A glass of wine equals 5 oz (150 mL).  This information  is not intended to replace advice given to you by your health care provider. Make sure you discuss any questions you have with your health care provider.  Document Released: 08/10/2017 Document Revised: 09/12/2017 Document Reviewed: 08/10/2017  [employer] Interactive Patient Education © 2018 [employer]

## 2018-10-11 NOTE — PROGRESS NOTES
Chief Complaint   Patient presents with   • Follow-up     for cardiac management   • Med Refill     needs refills on nitro,the lid came off the bottle he has, Refills needed on cardiac meds. 30 days to [facility] in [place].    • Shortness of Breath     still has some SOB but only with a lot of exertion now       CARDIAC COMPLAINTS  dyspnea and fatigue        Subjective   Luis Miguel Haley is a 58 y.o. male came in today for his follow-up visit.  He has history of significant rheumatoid arthritis and ischemic heart disease.  He underwent multiple stenting in 2017.  His last stress test in 2017 showed no does of ischemia.  Regarding the rheumatoid arthritis, his level was very high.  He was seen by rheumatologist and is now on Rituxan.  His sedimentation rate is come down to 40.  His last cholesterol checked in June was 131 with the LDL of 75.  He came today with his wife.  He has been having increasing shortness of breath which occurs mostly on mild-to-moderate exertion.  He denies having any chest pain but he has to stop multiple times while doing outside work.             developed CAD  Cardiac History  Past Surgical History:   Procedure Laterality Date   • CARDIAC CATHETERIZATION  04/21/2017    85% LAD- 2.5x30,2.5x30,2.5x22 Resolute. 85% RCA- 3.5x38,3.5x18,2.25x12 Resolute. 75% Ramus.   • CARDIAC CATHETERIZATION  05/19/2017    90% Ramus- 2.25x12,2.25x16,2.25x8 Promus   • CARDIOVASCULAR STRESS TEST  04/19/2017    @[facility]. R.Stress- 7 Min, 62% THR. BP- 180/80. Borderline test   • CARDIOVASCULAR STRESS TEST  07/25/2017    L.Myoview- EF 50%. Inferior Infarct   • ECHO - CONVERTED  04/20/2017    @[facility]- EF 65%. AV nodular Thickenning       Current Outpatient Prescriptions   Medication Sig Dispense Refill   • aspirin (ASPIRIN LOW DOSE) 81 MG EC tablet Take 1 tablet by mouth Daily. 30 tablet 11   • atorvastatin (LIPITOR) 20 MG tablet Take 1 tablet by mouth Daily. 30 tablet 7   • Cholecalciferol (VITAMIN D3) 2000 units tablet  Take 1 tablet by mouth Daily.     • furosemide (LASIX) 20 MG tablet Take 1 tablet by mouth Daily. 30 tablet 8   • lisinopril (PRINIVIL,ZESTRIL) 10 MG tablet Take 1 tablet by mouth Daily. 30 tablet 11   • nebivolol (BYSTOLIC) 5 MG tablet Take 1 tablet by mouth Daily. 30 tablet 8   • nitroglycerin (NITROSTAT) 0.4 MG SL tablet 1 under the tongue as needed for angina, may repeat q5mins for up three doses 100 tablet 11   • pantoprazole (PROTONIX) 40 MG EC tablet Take 1 tablet by mouth Daily. 30 tablet 8   • potassium chloride (MICRO-K) 8 MEQ CR capsule Take 1 capsule by mouth Daily. 30 each 8   • prasugrel (EFFIENT) 10 MG tablet Take 1 tablet by mouth Daily. 30 tablet 11   • pregabalin (LYRICA) 150 MG capsule Take 150 mg by mouth 2 (Two) Times a Day.     • riTUXimab (RITUXAN) 100 MG/10ML solution injection intraveniously every 6 months     • vitamin B-12 (CYANOCOBALAMIN) 1000 MCG tablet Take 1,000 mcg by mouth Daily.       No current facility-administered medications for this visit.        Allergies  :  Methotrexate derivatives and Orencia [abatacept]       Past Medical History:   Diagnosis Date   • Coronary artery disease    • H/O hernia repair    • Heart murmur    • Hypercholesteremia 5/10/2017   • Rheumatoid arthritis (CMS/HCC)        Social History     Social History   • Marital status:      Spouse name: N/A   • Number of children: N/A   • Years of education: N/A     Occupational History   • Not on file.     Social History Main Topics   • Smoking status: Former Smoker   • Smokeless tobacco: Never Used      Comment: quit 35 years ago   • Alcohol use Yes      Comment: rarely   • Drug use: No   • Sexual activity: Not on file     Other Topics Concern   • Not on file     Social History Narrative   • No narrative on file       Family History   Problem Relation Age of Onset   • Diabetes Mother    • Cancer Father    • Hypertension Sister        Review of Systems   Constitution: Positive for malaise/fatigue. Negative  "for decreased appetite.   HENT: Negative for congestion and sore throat.    Eyes: Negative for blurred vision.   Cardiovascular: Positive for dyspnea on exertion. Negative for chest pain.   Respiratory: Positive for shortness of breath. Negative for snoring.    Endocrine: Negative for cold intolerance and heat intolerance.   Hematologic/Lymphatic: Negative for adenopathy. Does not bruise/bleed easily.   Skin: Negative for itching, nail changes and skin cancer.   Musculoskeletal: Positive for arthritis and joint pain. Negative for myalgias.   Gastrointestinal: Negative for abdominal pain, dysphagia and heartburn.   Genitourinary: Negative for bladder incontinence and frequency.   Neurological: Negative for dizziness, light-headedness, seizures and vertigo.   Psychiatric/Behavioral: Negative for altered mental status.   Allergic/Immunologic: Negative for environmental allergies and hives.       Diabetes- No  Thyroid- normal    Objective     /80   Pulse 60   Ht 182.9 cm (72\")   Wt 111 kg (244 lb)   BMI 33.09 kg/m²     Physical Exam   Constitutional: He is oriented to person, place, and time. He appears well-developed and well-nourished.   HENT:   Head: Normocephalic.   Nose: Nose normal.   Eyes: Pupils are equal, round, and reactive to light. EOM are normal.   Neck: Normal range of motion. Neck supple.   Cardiovascular: Normal rate, regular rhythm, S1 normal and S2 normal.    Murmur heard.  Pulmonary/Chest: Effort normal and breath sounds normal.   Abdominal: Soft. Bowel sounds are normal.   Musculoskeletal: Normal range of motion. He exhibits no edema.   Neurological: He is alert and oriented to person, place, and time.   Skin: Skin is warm and dry.   Psychiatric: He has a normal mood and affect.     Procedures            Assessment/Plan   Patient's Body mass index is 33.09 kg/m². BMI is above normal parameters. Recommendations include: nutrition counseling.     Luis Miguel was seen today for follow-up, med " refill and shortness of breath.    Diagnoses and all orders for this visit:    IHD (ischemic heart disease)  -     nebivolol (BYSTOLIC) 5 MG tablet; Take 1 tablet by mouth Daily.  -     nitroglycerin (NITROSTAT) 0.4 MG SL tablet; 1 under the tongue as needed for angina, may repeat q5mins for up three doses  -     prasugrel (EFFIENT) 10 MG tablet; Take 1 tablet by mouth Daily.  -     Stress Test With Myocardial Perfusion One Day; Future  -     Adult Transthoracic Echo Complete W/ Cont if Necessary Per Protocol; Future    Essential hypertension  -     lisinopril (PRINIVIL,ZESTRIL) 10 MG tablet; Take 1 tablet by mouth Daily.  -     nebivolol (BYSTOLIC) 5 MG tablet; Take 1 tablet by mouth Daily.  -     furosemide (LASIX) 20 MG tablet; Take 1 tablet by mouth Daily.    Hypercholesteremia  -     atorvastatin (LIPITOR) 20 MG tablet; Take 1 tablet by mouth Daily.  -     Stress Test With Myocardial Perfusion One Day; Future    Rheumatoid arthritis involving multiple sites with positive rheumatoid factor (CMS/HCC)    Metabolic syndrome    Silent myocardial ischemia  -     Stress Test With Myocardial Perfusion One Day; Future    Shortness of breath  -     Adult Transthoracic Echo Complete W/ Cont if Necessary Per Protocol; Future    At baseline his heart rate and blood pressure appears stable.  His BMI is still elevated at 33.  I had a very long talk with him about his weight gain.  I talked to him about cutting down on the carbohydrate.  I gave him papers on Mediterranean diet.  I wrote prescription for is on cardiac medications.  Since he is having more symptoms, I scheduled him to undergo a stress test to rule out ischemia causing the increasing shortness of breath.  I also scheduled him to undergo an echocardiogram to reevaluate the LV function and the PA pressure.  If the PA pressure started to go up, he may need to undergo sleep study.  Based on the results, further recommendations will be made.                 Electronically signed by Millie Rueda MD October 11, 2018 5:27 PM

## 2018-10-17 ENCOUNTER — HOSPITAL ENCOUNTER (OUTPATIENT)
Dept: CARDIOLOGY | Facility: HOSPITAL | Age: 58
Discharge: HOME | End: 2018-10-17
Attending: INTERNAL MEDICINE

## 2018-10-17 DIAGNOSIS — I25.9 IHD (ISCHEMIC HEART DISEASE): ICD-10-CM

## 2018-10-17 DIAGNOSIS — I25.6 SILENT MYOCARDIAL ISCHEMIA: ICD-10-CM

## 2018-10-17 DIAGNOSIS — E78.00 HYPERCHOLESTEREMIA: ICD-10-CM

## 2018-10-17 DIAGNOSIS — R06.02 SHORTNESS OF BREATH: ICD-10-CM

## 2018-10-17 LAB
BH CV ECHO MEAS - ACS: 1.6 CM
BH CV ECHO MEAS - AO MEAN PG (FULL): 6.8 MMHG
BH CV ECHO MEAS - AO MEAN PG: 10.2 MMHG
BH CV ECHO MEAS - AO ROOT AREA (BSA CORRECTED): 1.3
BH CV ECHO MEAS - AO ROOT AREA: 7.1 CM^2
BH CV ECHO MEAS - AO ROOT DIAM: 3 CM
BH CV ECHO MEAS - AO V2 MEAN: 148.6 CM/SEC
BH CV ECHO MEAS - AO V2 VTI: 51.8 CM
BH CV ECHO MEAS - AVA(I,A): 2.1 CM^2
BH CV ECHO MEAS - AVA(I,D): 2.1 CM^2
BH CV ECHO MEAS - BSA(HAYCOCK): 2.4 M^2
BH CV ECHO MEAS - BSA: 2.3 M^2
BH CV ECHO MEAS - BZI_BMI: 33.1 KILOGRAMS/M^2
BH CV ECHO MEAS - BZI_METRIC_HEIGHT: 182.9 CM
BH CV ECHO MEAS - BZI_METRIC_WEIGHT: 110.7 KG
BH CV ECHO MEAS - EDV(CUBED): 45.2 ML
BH CV ECHO MEAS - EDV(MOD-SP4): 94 ML
BH CV ECHO MEAS - EDV(TEICH): 53.1 ML
BH CV ECHO MEAS - EF(CUBED): 71.4 %
BH CV ECHO MEAS - EF(MOD-SP4): 42.6 %
BH CV ECHO MEAS - EF(TEICH): 64.1 %
BH CV ECHO MEAS - ESV(CUBED): 12.9 ML
BH CV ECHO MEAS - ESV(MOD-SP4): 54 ML
BH CV ECHO MEAS - ESV(TEICH): 19.1 ML
BH CV ECHO MEAS - FS: 34.1 %
BH CV ECHO MEAS - IVS/LVPW: 0.96
BH CV ECHO MEAS - IVSD: 1.1 CM
BH CV ECHO MEAS - LA DIMENSION: 3.5 CM
BH CV ECHO MEAS - LA/AO: 1.2
BH CV ECHO MEAS - LV DIASTOLIC VOL/BSA (35-75): 40.5 ML/M^2
BH CV ECHO MEAS - LV IVRT: 0.11 SEC
BH CV ECHO MEAS - LV MASS(C)D: 123.9 GRAMS
BH CV ECHO MEAS - LV MASS(C)DI: 53.4 GRAMS/M^2
BH CV ECHO MEAS - LV MEAN PG: 3.4 MMHG
BH CV ECHO MEAS - LV SYSTOLIC VOL/BSA (12-30): 23.3 ML/M^2
BH CV ECHO MEAS - LV V1 MEAN: 86.4 CM/SEC
BH CV ECHO MEAS - LV V1 VTI: 30.1 CM
BH CV ECHO MEAS - LVIDD: 3.6 CM
BH CV ECHO MEAS - LVIDS: 2.3 CM
BH CV ECHO MEAS - LVLD AP4: 8.1 CM
BH CV ECHO MEAS - LVLS AP4: 7.2 CM
BH CV ECHO MEAS - LVOT AREA (M): 3.8 CM^2
BH CV ECHO MEAS - LVOT AREA: 3.7 CM^2
BH CV ECHO MEAS - LVOT DIAM: 2.2 CM
BH CV ECHO MEAS - LVPWD: 1.1 CM
BH CV ECHO MEAS - MV A MAX VEL: 70.1 CM/SEC
BH CV ECHO MEAS - MV DEC SLOPE: 338.3 CM/SEC^2
BH CV ECHO MEAS - MV E MAX VEL: 93.3 CM/SEC
BH CV ECHO MEAS - MV E/A: 1.3
BH CV ECHO MEAS - RVDD: 2.8 CM
BH CV ECHO MEAS - SI(AO): 159.8 ML/M^2
BH CV ECHO MEAS - SI(CUBED): 13.9 ML/M^2
BH CV ECHO MEAS - SI(LVOT): 47.6 ML/M^2
BH CV ECHO MEAS - SI(MOD-SP4): 17.3 ML/M^2
BH CV ECHO MEAS - SI(TEICH): 14.7 ML/M^2
BH CV ECHO MEAS - SV(AO): 370.4 ML
BH CV ECHO MEAS - SV(CUBED): 32.3 ML
BH CV ECHO MEAS - SV(LVOT): 110.3 ML
BH CV ECHO MEAS - SV(MOD-SP4): 40 ML
BH CV ECHO MEAS - SV(TEICH): 34 ML
BH CV STRESS COMMENTS STAGE 1: NORMAL
BH CV STRESS DOSE REGADENOSON STAGE 1: 0.4
BH CV STRESS DURATION MIN STAGE 1: 0
BH CV STRESS DURATION SEC STAGE 1: 10
BH CV STRESS PROTOCOL 1: NORMAL
BH CV STRESS RECOVERY BP: NORMAL MMHG
BH CV STRESS RECOVERY HR: 59 BPM
BH CV STRESS STAGE 1: 1
LV EF NUC BP: 57 %
MAXIMAL PREDICTED HEART RATE: 163 BPM
MAXIMAL PREDICTED HEART RATE: 163 BPM
PERCENT MAX PREDICTED HR: 41.72 %
STRESS BASELINE BP: NORMAL MMHG
STRESS BASELINE HR: 50 BPM
STRESS PERCENT HR: 49 %
STRESS POST PEAK BP: NORMAL MMHG
STRESS POST PEAK HR: 68 BPM
STRESS TARGET HR: 139 BPM
STRESS TARGET HR: 139 BPM

## 2018-10-17 PROCEDURE — 93017 CV STRESS TEST TRACING ONLY: CPT

## 2018-10-17 PROCEDURE — 93018 CV STRESS TEST I&R ONLY: CPT | Performed by: INTERNAL MEDICINE

## 2018-10-17 PROCEDURE — 78452 HT MUSCLE IMAGE SPECT MULT: CPT | Performed by: INTERNAL MEDICINE

## 2018-10-17 PROCEDURE — 0 TECHNETIUM SESTAMIBI: Performed by: INTERNAL MEDICINE

## 2018-10-17 PROCEDURE — A9500 TC99M SESTAMIBI: HCPCS | Performed by: INTERNAL MEDICINE

## 2018-10-17 PROCEDURE — 78452 HT MUSCLE IMAGE SPECT MULT: CPT

## 2018-10-17 PROCEDURE — 93306 TTE W/DOPPLER COMPLETE: CPT | Performed by: INTERNAL MEDICINE

## 2018-10-17 PROCEDURE — 25010000002 REGADENOSON 0.4 MG/5ML SOLUTION: Performed by: INTERNAL MEDICINE

## 2018-10-17 PROCEDURE — 93306 TTE W/DOPPLER COMPLETE: CPT

## 2018-10-17 RX ADMIN — REGADENOSON 0.4 MG: 0.08 INJECTION, SOLUTION INTRAVENOUS at 09:34

## 2018-10-17 RX ADMIN — TECHNETIUM TC 99M SESTAMIBI 1 DOSE: 1 INJECTION INTRAVENOUS at 09:34

## 2019-04-11 ENCOUNTER — OFFICE VISIT (OUTPATIENT)
Dept: CARDIOLOGY | Facility: CLINIC | Age: 59
End: 2019-04-11

## 2019-04-11 VITALS
DIASTOLIC BLOOD PRESSURE: 54 MMHG | HEART RATE: 60 BPM | WEIGHT: 224 LBS | BODY MASS INDEX: 30.34 KG/M2 | HEIGHT: 72 IN | SYSTOLIC BLOOD PRESSURE: 110 MMHG

## 2019-04-11 DIAGNOSIS — Z86.19 HISTORY OF SHINGLES: ICD-10-CM

## 2019-04-11 DIAGNOSIS — E88.81 METABOLIC SYNDROME: ICD-10-CM

## 2019-04-11 DIAGNOSIS — M05.79 RHEUMATOID ARTHRITIS INVOLVING MULTIPLE SITES WITH POSITIVE RHEUMATOID FACTOR (HCC): ICD-10-CM

## 2019-04-11 DIAGNOSIS — I10 ESSENTIAL HYPERTENSION: ICD-10-CM

## 2019-04-11 DIAGNOSIS — I25.9 IHD (ISCHEMIC HEART DISEASE): Primary | ICD-10-CM

## 2019-04-11 DIAGNOSIS — R06.02 SHORTNESS OF BREATH: ICD-10-CM

## 2019-04-11 DIAGNOSIS — E78.00 HYPERCHOLESTEREMIA: ICD-10-CM

## 2019-04-11 PROCEDURE — 99213 OFFICE O/P EST LOW 20 MIN: CPT | Performed by: INTERNAL MEDICINE

## 2019-04-11 RX ORDER — POTASSIUM CHLORIDE 600 MG/1
8 CAPSULE, EXTENDED RELEASE ORAL DAILY
Qty: 30 EACH | Refills: 8 | Status: SHIPPED | OUTPATIENT
Start: 2019-04-11 | End: 2019-10-14 | Stop reason: SDUPTHER

## 2019-04-11 RX ORDER — LISINOPRIL 10 MG/1
10 TABLET ORAL DAILY
Qty: 30 TABLET | Refills: 11 | Status: SHIPPED | OUTPATIENT
Start: 2019-04-11 | End: 2020-05-11

## 2019-04-11 RX ORDER — FUROSEMIDE 20 MG/1
20 TABLET ORAL DAILY
Qty: 30 TABLET | Refills: 8 | Status: SHIPPED | OUTPATIENT
Start: 2019-04-11 | End: 2019-10-14 | Stop reason: SDUPTHER

## 2019-04-11 RX ORDER — ATORVASTATIN CALCIUM 20 MG/1
20 TABLET, FILM COATED ORAL DAILY
Qty: 30 TABLET | Refills: 7 | Status: SHIPPED | OUTPATIENT
Start: 2019-04-11 | End: 2020-05-11

## 2019-04-11 RX ORDER — NEBIVOLOL 5 MG/1
5 TABLET ORAL DAILY
Qty: 30 TABLET | Refills: 8 | Status: SHIPPED | OUTPATIENT
Start: 2019-04-11 | End: 2019-10-14 | Stop reason: SDUPTHER

## 2019-04-11 RX ORDER — PRASUGREL 10 MG/1
10 TABLET, FILM COATED ORAL DAILY
Qty: 30 TABLET | Refills: 11 | Status: SHIPPED | OUTPATIENT
Start: 2019-04-11 | End: 2020-08-24 | Stop reason: SDUPTHER

## 2019-04-11 NOTE — PROGRESS NOTES
Chief Complaint   Patient presents with   • Follow-up     for cardiac management   • Med Refill     refills needed on cardiac meds, 30 days to [facility].    • Labs     PCP last checked in January   • Weight Loss     pt has lost 20 lbs!! changed his diet!       CARDIAC COMPLAINTS  fatigue and Cardiac Management        Subjective   Luis Miguel Haley is a 59 y.o. male came in today for his follow-up visit.  He has history of hypertension, hypercholesterolemia and significant rheumatoid arthritis.  He was diagnosed with ischemic heart disease in 2017 and underwent multiple stenting of his LAD, RCA and ramus.  He also was seen by the rheumatologist and aggressive treatment for his rheumatoid arthritis was done.  His last stress test which was done in 2018 showed no ischemia.  He came today with no new symptoms from cardiac standpoint.  He has gone on a strict diet and has lost about 20 pounds.  He apparently had labs done at your office a few months ago.              Cardiac History  Past Surgical History:   Procedure Laterality Date   • CARDIAC CATHETERIZATION  04/21/2017    85% LAD- 2.5x30,2.5x30,2.5x22 Resolute. 85% RCA- 3.5x38,3.5x18,2.25x12 Resolute. 75% Ramus.   • CARDIAC CATHETERIZATION  05/19/2017    90% Ramus- 2.25x12,2.25x16,2.25x8 Promus   • CARDIOVASCULAR STRESS TEST  04/19/2017    @[facility] R.Stress- 7 Min, 62% THR. BP- 180/80. Borderline test   • CARDIOVASCULAR STRESS TEST  07/25/2017    L.Myoview- EF 50%. Inferior Infarct   • CARDIOVASCULAR STRESS TEST  10/17/2018    L.Cardiolite- EF 57%. Inferior Infarct Vs Diapharamatic attenuation.   • ECHO - CONVERTED  04/20/2017    @[facility]- EF 65%. AV nodular Thickenning   • ECHO - CONVERTED  10/17/2018    EF 55-60%       Current Outpatient Medications   Medication Sig Dispense Refill   • aspirin (ASPIRIN LOW DOSE) 81 MG EC tablet Take 1 tablet by mouth Daily. 30 tablet 11   • atorvastatin (LIPITOR) 20 MG tablet Take 1 tablet by mouth Daily. 30 tablet 7   • Cholecalciferol (VITAMIN  D3) 2000 units tablet Take 1 tablet by mouth Daily.     • furosemide (LASIX) 20 MG tablet Take 1 tablet by mouth Daily. 30 tablet 8   • lisinopril (PRINIVIL,ZESTRIL) 10 MG tablet Take 1 tablet by mouth Daily. 30 tablet 11   • nebivolol (BYSTOLIC) 5 MG tablet Take 1 tablet by mouth Daily. 30 tablet 8   • nitroglycerin (NITROSTAT) 0.4 MG SL tablet 1 under the tongue as needed for angina, may repeat q5mins for up three doses 100 tablet 11   • pantoprazole (PROTONIX) 40 MG EC tablet Take 1 tablet by mouth Daily. 30 tablet 8   • potassium chloride (MICRO-K) 8 MEQ CR capsule Take 1 capsule by mouth Daily. 30 each 8   • prasugrel (EFFIENT) 10 MG tablet Take 1 tablet by mouth Daily. 30 tablet 11   • pregabalin (LYRICA) 150 MG capsule Take 150 mg by mouth 3 (Three) Times a Day.     • riTUXimab (RITUXAN) 100 MG/10ML solution injection intraveniously every 6 months     • vitamin B-12 (CYANOCOBALAMIN) 1000 MCG tablet Take 1,000 mcg by mouth Daily.       No current facility-administered medications for this visit.        Allergies  :  Methotrexate derivatives and Orencia [abatacept]       Past Medical History:   Diagnosis Date   • Coronary artery disease    • H/O hernia repair    • Heart murmur    • Hypercholesteremia 5/10/2017   • Rheumatoid arthritis (CMS/HCC)        Social History     Socioeconomic History   • Marital status:      Spouse name: Not on file   • Number of children: Not on file   • Years of education: Not on file   • Highest education level: Not on file   Tobacco Use   • Smoking status: Former Smoker   • Smokeless tobacco: Never Used   • Tobacco comment: quit 35 years ago   Substance and Sexual Activity   • Alcohol use: Yes     Comment: rarely   • Drug use: No       Family History   Problem Relation Age of Onset   • Diabetes Mother    • Cancer Father    • Hypertension Sister        Review of Systems   Constitution: Positive for malaise/fatigue and weight loss. Negative for decreased appetite.   HENT:  "Negative for congestion and sore throat.    Eyes: Negative for blurred vision.   Cardiovascular: Negative for chest pain, dyspnea on exertion and palpitations.   Respiratory: Negative for shortness of breath and snoring.    Endocrine: Negative for cold intolerance and heat intolerance.   Hematologic/Lymphatic: Negative for adenopathy. Does not bruise/bleed easily.   Skin: Negative for itching, nail changes and skin cancer.   Musculoskeletal: Positive for arthritis and joint pain. Negative for myalgias.   Gastrointestinal: Negative for abdominal pain, dysphagia and heartburn.   Genitourinary: Negative for bladder incontinence and frequency.   Neurological: Negative for dizziness, light-headedness, seizures and vertigo.   Psychiatric/Behavioral: Negative for altered mental status.   Allergic/Immunologic: Negative for environmental allergies and hives.       Diabetes- No  Thyroid- normal    Objective     /54   Pulse 60   Ht 182.9 cm (72\")   Wt 102 kg (224 lb)   BMI 30.38 kg/m²     Physical Exam   Constitutional: He is oriented to person, place, and time. He appears well-developed and well-nourished.   HENT:   Head: Normocephalic.   Nose: Nose normal.   Eyes: EOM are normal. Pupils are equal, round, and reactive to light.   Neck: Normal range of motion. Neck supple.   Cardiovascular: Normal rate, regular rhythm, S1 normal and S2 normal.   Murmur heard.  Pulmonary/Chest: Effort normal and breath sounds normal.   Abdominal: Soft. Bowel sounds are normal.   Musculoskeletal: Normal range of motion. He exhibits no edema.   Neurological: He is alert and oriented to person, place, and time.   Skin: Skin is warm and dry.   Psychiatric: He has a normal mood and affect.     Procedures            Assessment/Plan   Patient's Body mass index is 30.38 kg/m². BMI is elevated. Diet was given.  .     Luis Miguel was seen today for follow-up, med refill, labs and weight loss.    Diagnoses and all orders for this visit:    IHD " (ischemic heart disease)  -     nebivolol (BYSTOLIC) 5 MG tablet; Take 1 tablet by mouth Daily.  -     prasugrel (EFFIENT) 10 MG tablet; Take 1 tablet by mouth Daily.    Essential hypertension  -     furosemide (LASIX) 20 MG tablet; Take 1 tablet by mouth Daily.  -     lisinopril (PRINIVIL,ZESTRIL) 10 MG tablet; Take 1 tablet by mouth Daily.  -     nebivolol (BYSTOLIC) 5 MG tablet; Take 1 tablet by mouth Daily.  -     potassium chloride (MICRO-K) 8 MEQ CR capsule; Take 1 capsule by mouth Daily.    Hypercholesteremia  -     atorvastatin (LIPITOR) 20 MG tablet; Take 1 tablet by mouth Daily.    Shortness of breath    Rheumatoid arthritis involving multiple sites with positive rheumatoid factor (CMS/HCC)    Metabolic syndrome    History of shingles       At baseline his heart rate and blood pressure appears stable.  His BMI has come down to 30.  I encouraged him to continue the diet and continue the weight reduction.  Prescription for his medication was given.  Advised him to talk to you regarding the lipids.  I like to keep the LDL as low as possible.  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  At this time continue the same medication.  Reassurance about his cardiac status was given.  I will see him back in 6 months or sooner if needed                  Electronically signed by Millie Rueda MD April 11, 2019 3:29 PM

## 2019-05-15 DIAGNOSIS — I25.9 IHD (ISCHEMIC HEART DISEASE): ICD-10-CM

## 2019-05-15 RX ORDER — ASPIRIN 81 MG/1
TABLET, COATED ORAL
Qty: 30 TABLET | Refills: 5 | Status: SHIPPED | OUTPATIENT
Start: 2019-05-15 | End: 2019-10-14 | Stop reason: SDUPTHER

## 2019-10-14 ENCOUNTER — OFFICE VISIT (OUTPATIENT)
Dept: CARDIOLOGY | Facility: CLINIC | Age: 59
End: 2019-10-14

## 2019-10-14 VITALS
BODY MASS INDEX: 29.8 KG/M2 | DIASTOLIC BLOOD PRESSURE: 80 MMHG | HEIGHT: 72 IN | WEIGHT: 220 LBS | HEART RATE: 58 BPM | SYSTOLIC BLOOD PRESSURE: 110 MMHG

## 2019-10-14 DIAGNOSIS — M05.79 RHEUMATOID ARTHRITIS INVOLVING MULTIPLE SITES WITH POSITIVE RHEUMATOID FACTOR (HCC): ICD-10-CM

## 2019-10-14 DIAGNOSIS — E88.81 METABOLIC SYNDROME: ICD-10-CM

## 2019-10-14 DIAGNOSIS — I25.9 IHD (ISCHEMIC HEART DISEASE): Primary | ICD-10-CM

## 2019-10-14 DIAGNOSIS — E78.00 HYPERCHOLESTEREMIA: ICD-10-CM

## 2019-10-14 DIAGNOSIS — I10 ESSENTIAL HYPERTENSION: ICD-10-CM

## 2019-10-14 PROCEDURE — 99213 OFFICE O/P EST LOW 20 MIN: CPT | Performed by: INTERNAL MEDICINE

## 2019-10-14 RX ORDER — NEBIVOLOL 5 MG/1
5 TABLET ORAL DAILY
Qty: 30 TABLET | Refills: 8 | Status: SHIPPED | OUTPATIENT
Start: 2019-10-14 | End: 2020-08-05

## 2019-10-14 RX ORDER — POTASSIUM CHLORIDE 600 MG/1
8 CAPSULE, EXTENDED RELEASE ORAL DAILY
Qty: 30 EACH | Refills: 8 | Status: SHIPPED | OUTPATIENT
Start: 2019-10-14 | End: 2020-08-24 | Stop reason: ALTCHOICE

## 2019-10-14 RX ORDER — FUROSEMIDE 20 MG/1
20 TABLET ORAL DAILY
Qty: 30 TABLET | Refills: 8 | Status: SHIPPED | OUTPATIENT
Start: 2019-10-14 | End: 2020-05-11

## 2019-10-14 RX ORDER — ASPIRIN 81 MG/1
81 TABLET ORAL DAILY
Qty: 30 TABLET | Refills: 8 | Status: SHIPPED | OUTPATIENT
Start: 2019-10-14 | End: 2020-08-05

## 2019-10-14 NOTE — PROGRESS NOTES
Chief Complaint   Patient presents with   • Follow-up     cardiac management.   • Labs     6/2019 labs in chart   • Med Refill     pt brought med bottles.  needs refills on lasix, ASA, and bystolic, 30 days [facility]       CARDIAC COMPLAINTS  Cardiac Management.        Subjective   Luis Miguel Haley is a 59 y.o. male came in today for his follow-up visit.  He has history of significant rheumatoid arthritis, hypertension and hypercholesterolemia was diagnosed with ischemic heart disease in 2017 and underwent stenting of the LAD, right coronary artery as well as an ramus in a staged manner.  His last cardiac work-up which was done in October 2018 showed no significant ischemia and the LV function was within normal limit.  He was having a lot of problem with the rheumatoid arthritis and medication changes were made and is doing much better now.  He is active but he does not do any regular exercise.  His last lab work which was done in June showed his BUN and creatinine was slightly elevated with the GFR of 48.  His cholesterol is 133 with the LDL of 79 and his sed rate was 30.  He denies having any more chest pain he does have some shortness of breath mostly when he exerts himself.  He has been cutting down on the carbohydrate.  He has not had any potatoes for a long time.  He has lost about 4 pounds since I have seen him.              Cardiac History  Past Surgical History:   Procedure Laterality Date   • CARDIAC CATHETERIZATION  04/21/2017    85% LAD- 2.5x30,2.5x30,2.5x22 Resolute. 85% RCA- 3.5x38,3.5x18,2.25x12 Resolute. 75% Ramus.   • CARDIAC CATHETERIZATION  05/19/2017    90% Ramus- 2.25x12,2.25x16,2.25x8 Promus   • CARDIOVASCULAR STRESS TEST  04/19/2017    @[facility]. R.Stress- 7 Min, 62% THR. BP- 180/80. Borderline test   • CARDIOVASCULAR STRESS TEST  07/25/2017    L.Myoview- EF 50%. Inferior Infarct   • CARDIOVASCULAR STRESS TEST  10/17/2018    L.Cardiolite- EF 57%. Inferior Infarct Vs Diapharamatic attenuation.   • ECHO  - CONVERTED  04/20/2017    @[facility]- EF 65%. AV nodular Thickenning   • ECHO - CONVERTED  10/17/2018    EF 55-60%       Current Outpatient Medications   Medication Sig Dispense Refill   • aspirin (ASPIRIN LOW DOSE) 81 MG EC tablet Take 1 tablet by mouth Daily. 30 tablet 8   • atorvastatin (LIPITOR) 20 MG tablet Take 1 tablet by mouth Daily. 30 tablet 7   • Cholecalciferol (VITAMIN D3) 2000 units tablet Take 1 tablet by mouth Daily.     • furosemide (LASIX) 20 MG tablet Take 1 tablet by mouth Daily. 30 tablet 8   • lisinopril (PRINIVIL,ZESTRIL) 10 MG tablet Take 1 tablet by mouth Daily. 30 tablet 11   • nebivolol (BYSTOLIC) 5 MG tablet Take 1 tablet by mouth Daily. 30 tablet 8   • nitroglycerin (NITROSTAT) 0.4 MG SL tablet 1 under the tongue as needed for angina, may repeat q5mins for up three doses 100 tablet 11   • pantoprazole (PROTONIX) 40 MG EC tablet Take 1 tablet by mouth Daily. 30 tablet 8   • potassium chloride (MICRO-K) 8 MEQ CR capsule Take 1 capsule by mouth Daily. 30 each 8   • prasugrel (EFFIENT) 10 MG tablet Take 1 tablet by mouth Daily. 30 tablet 11   • pregabalin (LYRICA) 150 MG capsule Take 150 mg by mouth 3 (Three) Times a Day.     • riTUXimab (RITUXAN) 100 MG/10ML solution injection intraveniously every 6 months     • vitamin B-12 (CYANOCOBALAMIN) 1000 MCG tablet Take 1,000 mcg by mouth Daily.       No current facility-administered medications for this visit.        Allergies  :  Methotrexate derivatives and Orencia [abatacept]       Past Medical History:   Diagnosis Date   • Coronary artery disease    • H/O hernia repair    • Heart murmur    • Hypercholesteremia 5/10/2017   • Rheumatoid arthritis (CMS/[facility])        Social History     Socioeconomic History   • Marital status:      Spouse name: Not on file   • Number of children: Not on file   • Years of education: Not on file   • Highest education level: Not on file   Tobacco Use   • Smoking status: Former Smoker   • Smokeless tobacco: Never  "Used   • Tobacco comment: quit 35 years ago   Substance and Sexual Activity   • Alcohol use: Yes     Comment: rarely   • Drug use: No       Family History   Problem Relation Age of Onset   • Diabetes Mother    • Cancer Father    • Hypertension Sister        Review of Systems   Constitution: Positive for weakness. Negative for decreased appetite and malaise/fatigue.   HENT: Negative for congestion and sore throat.    Eyes: Negative for blurred vision.   Cardiovascular: Negative for chest pain, dyspnea on exertion and palpitations.   Respiratory: Negative for shortness of breath and snoring.    Endocrine: Negative for cold intolerance and heat intolerance.   Hematologic/Lymphatic: Negative for adenopathy. Does not bruise/bleed easily.   Skin: Negative for itching, nail changes and skin cancer.   Musculoskeletal: Positive for arthritis and joint pain. Negative for myalgias.   Gastrointestinal: Negative for abdominal pain, dysphagia and heartburn.   Genitourinary: Negative for bladder incontinence and frequency.   Neurological: Negative for dizziness, light-headedness, seizures and vertigo.   Psychiatric/Behavioral: Negative for altered mental status.   Allergic/Immunologic: Negative for environmental allergies and hives.       Diabetes- No  Thyroid- normal    Objective     /80 (BP Location: Left arm)   Pulse 58   Ht 182.9 cm (72\")   Wt 99.8 kg (220 lb)   BMI 29.84 kg/m²     Physical Exam   Constitutional: He is oriented to person, place, and time. He appears well-developed and well-nourished.   HENT:   Head: Normocephalic.   Nose: Nose normal.   Eyes: EOM are normal. Pupils are equal, round, and reactive to light.   Neck: Normal range of motion. Neck supple.   Cardiovascular: Normal rate, regular rhythm, S1 normal and S2 normal.   Murmur heard.  Pulmonary/Chest: Effort normal and breath sounds normal.   Abdominal: Soft. Bowel sounds are normal.   Musculoskeletal: Normal range of motion.   Neurological: He is " alert and oriented to person, place, and time.   Skin: Skin is warm and dry.   Psychiatric: He has a normal mood and affect.     Procedures            Assessment/Plan   Patient's Body mass index is 29.84 kg/m². BMI is above normal parameters. Recommendations include: nutrition counseling.     Luis Miguel was seen today for follow-up, labs and med refill.    Diagnoses and all orders for this visit:    IHD (ischemic heart disease)  -     nebivolol (BYSTOLIC) 5 MG tablet; Take 1 tablet by mouth Daily.  -     aspirin (ASPIRIN LOW DOSE) 81 MG EC tablet; Take 1 tablet by mouth Daily.    Essential hypertension  -     furosemide (LASIX) 20 MG tablet; Take 1 tablet by mouth Daily.  -     potassium chloride (MICRO-K) 8 MEQ CR capsule; Take 1 capsule by mouth Daily.  -     nebivolol (BYSTOLIC) 5 MG tablet; Take 1 tablet by mouth Daily.    Hypercholesteremia    Rheumatoid arthritis involving multiple sites with positive rheumatoid factor (CMS/[facility])    Metabolic syndrome    At baseline his heart rate and blood pressure appears stable.  His BMI is still around 30.  His clinical examination is unremarkable other than an ejection systolic murmur at the aortic area.  His last echocardiogram showed only aortic sclerosis.    Regarding his ischemic heart disease at this time we will continue the Effient and aspirin along with Bystolic.  He is not having any anginal symptoms so we will continue to monitor.    Regarding his blood pressure appears to be very well controlled with lisinopril, Bystolic along with Lasix.  We will continue the same and recheck his renal function.  If the GFR start dropping then we may need to consider taking him off the Lasix.    Regarding his hypercholesterolemia, it is very well controlled with Lipitor 20 mg and will continue the same.    Regarding the metabolic syndrome, I advised him about regular exercise for at least 20 minutes a day for 3 to 4 days a week.  I also talked to him about cutting down on the  other sweets also.    Regarding the rheumatoid arthritis it appears to be getting better so we will continue the same.                    Electronically signed by Millie Rueda MD October 14, 2019 3:49 PM

## 2020-03-01 DIAGNOSIS — I25.9 IHD (ISCHEMIC HEART DISEASE): ICD-10-CM

## 2020-03-02 RX ORDER — NITROGLYCERIN 0.4 MG/1
TABLET SUBLINGUAL
Qty: 25 TABLET | Refills: 10 | Status: SHIPPED | OUTPATIENT
Start: 2020-03-02 | End: 2023-02-14 | Stop reason: SDUPTHER

## 2020-05-09 DIAGNOSIS — I10 ESSENTIAL HYPERTENSION: ICD-10-CM

## 2020-05-09 DIAGNOSIS — E78.00 HYPERCHOLESTEREMIA: ICD-10-CM

## 2020-05-11 RX ORDER — FUROSEMIDE 20 MG/1
20 TABLET ORAL DAILY
Qty: 30 TABLET | Refills: 1 | Status: SHIPPED | OUTPATIENT
Start: 2020-05-11 | End: 2020-07-07

## 2020-05-11 RX ORDER — ATORVASTATIN CALCIUM 20 MG/1
20 TABLET, FILM COATED ORAL DAILY
Qty: 30 TABLET | Refills: 1 | Status: SHIPPED | OUTPATIENT
Start: 2020-05-11 | End: 2020-07-07

## 2020-05-11 RX ORDER — LISINOPRIL 10 MG/1
10 TABLET ORAL DAILY
Qty: 30 TABLET | Refills: 1 | Status: SHIPPED | OUTPATIENT
Start: 2020-05-11 | End: 2020-07-07

## 2020-07-07 DIAGNOSIS — I10 ESSENTIAL HYPERTENSION: ICD-10-CM

## 2020-07-07 DIAGNOSIS — E78.00 HYPERCHOLESTEREMIA: ICD-10-CM

## 2020-07-07 RX ORDER — FUROSEMIDE 20 MG/1
20 TABLET ORAL DAILY
Qty: 30 TABLET | Refills: 1 | Status: SHIPPED | OUTPATIENT
Start: 2020-07-07 | End: 2020-08-24 | Stop reason: ALTCHOICE

## 2020-07-07 RX ORDER — LISINOPRIL 10 MG/1
10 TABLET ORAL DAILY
Qty: 30 TABLET | Refills: 1 | Status: SHIPPED | OUTPATIENT
Start: 2020-07-07 | End: 2020-08-24 | Stop reason: SDUPTHER

## 2020-07-07 RX ORDER — ATORVASTATIN CALCIUM 20 MG/1
20 TABLET, FILM COATED ORAL DAILY
Qty: 30 TABLET | Refills: 1 | Status: SHIPPED | OUTPATIENT
Start: 2020-07-07 | End: 2020-08-24 | Stop reason: SDUPTHER

## 2020-08-05 DIAGNOSIS — I10 ESSENTIAL HYPERTENSION: ICD-10-CM

## 2020-08-05 DIAGNOSIS — I25.9 IHD (ISCHEMIC HEART DISEASE): ICD-10-CM

## 2020-08-05 RX ORDER — NEBIVOLOL HYDROCHLORIDE 5 MG/1
TABLET ORAL
Qty: 30 TABLET | Refills: 7 | Status: SHIPPED | OUTPATIENT
Start: 2020-08-05 | End: 2020-08-24 | Stop reason: SDUPTHER

## 2020-08-05 RX ORDER — ASPIRIN 81 MG/1
TABLET, COATED ORAL
Qty: 30 TABLET | Refills: 7 | Status: SHIPPED | OUTPATIENT
Start: 2020-08-05 | End: 2021-04-13

## 2020-08-24 ENCOUNTER — OFFICE VISIT (OUTPATIENT)
Dept: CARDIOLOGY | Facility: CLINIC | Age: 60
End: 2020-08-24

## 2020-08-24 VITALS
BODY MASS INDEX: 29.39 KG/M2 | HEIGHT: 72 IN | SYSTOLIC BLOOD PRESSURE: 114 MMHG | WEIGHT: 217 LBS | TEMPERATURE: 97.3 F | HEART RATE: 56 BPM | DIASTOLIC BLOOD PRESSURE: 80 MMHG

## 2020-08-24 DIAGNOSIS — E88.81 METABOLIC SYNDROME: ICD-10-CM

## 2020-08-24 DIAGNOSIS — I10 ESSENTIAL HYPERTENSION: ICD-10-CM

## 2020-08-24 DIAGNOSIS — I25.9 IHD (ISCHEMIC HEART DISEASE): Primary | ICD-10-CM

## 2020-08-24 DIAGNOSIS — M05.79 RHEUMATOID ARTHRITIS INVOLVING MULTIPLE SITES WITH POSITIVE RHEUMATOID FACTOR (HCC): ICD-10-CM

## 2020-08-24 DIAGNOSIS — R06.02 SHORTNESS OF BREATH: ICD-10-CM

## 2020-08-24 DIAGNOSIS — E78.00 HYPERCHOLESTEREMIA: ICD-10-CM

## 2020-08-24 DIAGNOSIS — Z86.19 HISTORY OF SHINGLES: ICD-10-CM

## 2020-08-24 PROCEDURE — 99213 OFFICE O/P EST LOW 20 MIN: CPT | Performed by: INTERNAL MEDICINE

## 2020-08-24 RX ORDER — NEBIVOLOL 5 MG/1
5 TABLET ORAL DAILY
Qty: 30 TABLET | Refills: 8 | Status: SHIPPED | OUTPATIENT
Start: 2020-08-24 | End: 2021-03-04

## 2020-08-24 RX ORDER — PRASUGREL 10 MG/1
10 TABLET, FILM COATED ORAL DAILY
Qty: 30 TABLET | Refills: 8 | Status: SHIPPED | OUTPATIENT
Start: 2020-08-24 | End: 2021-03-04 | Stop reason: SDUPTHER

## 2020-08-24 RX ORDER — LISINOPRIL 10 MG/1
10 TABLET ORAL DAILY
Qty: 30 TABLET | Refills: 8 | Status: SHIPPED | OUTPATIENT
Start: 2020-08-24 | End: 2021-03-04 | Stop reason: SDUPTHER

## 2020-08-24 RX ORDER — ATORVASTATIN CALCIUM 20 MG/1
20 TABLET, FILM COATED ORAL DAILY
Qty: 30 TABLET | Refills: 8 | Status: SHIPPED | OUTPATIENT
Start: 2020-08-24 | End: 2021-03-04 | Stop reason: SDUPTHER

## 2020-08-24 NOTE — PROGRESS NOTES
Chief Complaint   Patient presents with   • Follow-up     for cardiac management, reports doing well   • Labs     PCP checked labs, kidney function abnormal, stopped Lasix and k+   • Med Refill     refills needed on cardaic meds, 30 days to [facility] in [place].        CARDIAC COMPLAINTS  fatigue        Subjective   Luis Miguel Haley is a 60 y.o. male came in today for his regular follow-up visit.  He has history of hypertension, hypercholesterolemia also has significant rheumatoid arthritis.  He was diagnosed with ischemic heart disease in 2017 when he underwent stenting of the LAD RCA and ramus.  He then underwent stress test in 2018 which showed the LV function was within normal limit and there was no ischemia.  He came today for the follow-up visit stating that his GFR apparently did drop.  In June 2019 it was 48.  And now it dropped to 38.  His Lasix is on hold now.  His cholesterol is very well controlled at 137 with the LDL of 89.  He is taking Lipitor and tolerating it well.  He denies having any chest pain.  He still has some pain coming from his shingles.  He has cut down the amount of Lyrica he takes.  He is trying to drink more fluids.              Cardiac History  Past Surgical History:   Procedure Laterality Date   • CARDIAC CATHETERIZATION  04/21/2017    85% LAD- 2.5x30,2.5x30,2.5x22 Resolute. 85% RCA- 3.5x38,3.5x18,2.25x12 Resolute. 75% Ramus.   • CARDIAC CATHETERIZATION  05/19/2017    90% Ramus- 2.25x12,2.25x16,2.25x8 Promus   • CARDIOVASCULAR STRESS TEST  04/19/2017    @[facility]. R.Stress- 7 Min, 62% THR. BP- 180/80. Borderline test   • CARDIOVASCULAR STRESS TEST  07/25/2017    L.Myoview- EF 50%. Inferior Infarct   • CARDIOVASCULAR STRESS TEST  10/17/2018    L.Cardiolite- EF 57%. Inferior Infarct Vs Diapharamatic attenuation.   • ECHO - CONVERTED  04/20/2017    @[facility]- EF 65%. AV nodular Thickenning   • ECHO - CONVERTED  10/17/2018    EF 55-60%       Current Outpatient Medications   Medication Sig  Dispense Refill   • ASPIRIN LOW DOSE 81 MG EC tablet TAKE ONE TABLET BY MOUTH DAILY 30 tablet 7   • atorvastatin (LIPITOR) 20 MG tablet Take 1 tablet by mouth Daily. 30 tablet 8   • Cholecalciferol (VITAMIN D3) 2000 units tablet Take 1 tablet by mouth Daily.     • lisinopril (PRINIVIL,ZESTRIL) 10 MG tablet Take 1 tablet by mouth Daily. 30 tablet 8   • nebivolol (Bystolic) 5 MG tablet Take 1 tablet by mouth Daily. 30 tablet 8   • nitroglycerin (NITROSTAT) 0.4 MG SL tablet DISSOLVE 1 TAB UNDER TONGUE FOR CHEST PAIN - IF PAIN REMAINS AFTER 5 MIN, CALL 911 AND REPEAT DOSE. MAX 3 TABS IN 15 MINUTES 25 tablet 10   • pantoprazole (PROTONIX) 40 MG EC tablet Take 1 tablet by mouth Daily. 30 tablet 8   • prasugrel (EFFIENT) 10 MG tablet Take 1 tablet by mouth Daily. 30 tablet 8   • pregabalin (LYRICA) 150 MG capsule Take 150 mg by mouth 3 (Three) Times a Day.     • riTUXimab (RITUXAN) 100 MG/10ML solution injection intraveniously every 6 months     • vitamin B-12 (CYANOCOBALAMIN) 1000 MCG tablet Take 1,000 mcg by mouth Daily.       No current facility-administered medications for this visit.        Allergies  :  Methotrexate derivatives and Orencia [abatacept]       Past Medical History:   Diagnosis Date   • Coronary artery disease    • H/O hernia repair    • Heart murmur    • Hypercholesteremia 5/10/2017   • Rheumatoid arthritis (CMS/HCC)        Social History     Socioeconomic History   • Marital status:      Spouse name: Not on file   • Number of children: Not on file   • Years of education: Not on file   • Highest education level: Not on file   Tobacco Use   • Smoking status: Former Smoker   • Smokeless tobacco: Never Used   • Tobacco comment: quit 35 years ago   Substance and Sexual Activity   • Alcohol use: Yes     Comment: rarely   • Drug use: No       Family History   Problem Relation Age of Onset   • Diabetes Mother    • Cancer Father    • Hypertension Sister        Review of Systems   Constitution: Positive  "for malaise/fatigue. Negative for decreased appetite.   HENT: Negative for congestion and sore throat.    Eyes: Negative for blurred vision.   Cardiovascular: Negative for chest pain and palpitations.   Respiratory: Negative for shortness of breath and snoring.    Endocrine: Negative for cold intolerance and heat intolerance.   Hematologic/Lymphatic: Negative for adenopathy. Does not bruise/bleed easily.   Skin: Negative for itching, nail changes and skin cancer.   Musculoskeletal: Positive for arthritis and joint pain. Negative for myalgias.   Gastrointestinal: Negative for abdominal pain, dysphagia and heartburn.   Genitourinary: Negative for bladder incontinence and frequency.   Neurological: Negative for dizziness, light-headedness, seizures and vertigo.   Psychiatric/Behavioral: Negative for altered mental status.   Allergic/Immunologic: Negative for environmental allergies and hives.       Diabetes- No  Thyroid- normal    Objective     /80   Pulse 56   Temp 97.3 °F (36.3 °C)   Ht 182.9 cm (72\")   Wt 98.4 kg (217 lb)   BMI 29.43 kg/m²     Physical Exam   Constitutional: He is oriented to person, place, and time. He appears well-developed and well-nourished.   HENT:   Head: Normocephalic.   Nose: Nose normal.   Eyes: Pupils are equal, round, and reactive to light. EOM are normal.   Neck: Normal range of motion. Neck supple.   Cardiovascular: Normal rate, regular rhythm, S1 normal and S2 normal.   Murmur heard.  Pulmonary/Chest: Effort normal and breath sounds normal.   Abdominal: Soft. Bowel sounds are normal.   Musculoskeletal: Normal range of motion. He exhibits no edema.   Neurological: He is alert and oriented to person, place, and time.   Skin: Skin is warm and dry.   Psychiatric: He has a normal mood and affect.     Procedures            Assessment/Plan   Patient's Body mass index is 29.43 kg/m². BMI is above normal parameters. Recommendations include: nutrition counseling.     Luis Miguel was seen " today for follow-up, labs and med refill.    Diagnoses and all orders for this visit:    IHD (ischemic heart disease)  -     nebivolol (Bystolic) 5 MG tablet; Take 1 tablet by mouth Daily.  -     prasugrel (EFFIENT) 10 MG tablet; Take 1 tablet by mouth Daily.    Essential hypertension  -     lisinopril (PRINIVIL,ZESTRIL) 10 MG tablet; Take 1 tablet by mouth Daily.  -     nebivolol (Bystolic) 5 MG tablet; Take 1 tablet by mouth Daily.    Hypercholesteremia  -     atorvastatin (LIPITOR) 20 MG tablet; Take 1 tablet by mouth Daily.    Shortness of breath    Rheumatoid arthritis involving multiple sites with positive rheumatoid factor (CMS/HCC)    Metabolic syndrome    History of shingles       At baseline his heart rate is lower limit of normal.  His blood pressure is stable.  His BMI is still around 29.  His clinical examination is unremarkable other than a short systolic murmur at the mitral area.  He has no pedal edema at this time.    Regarding his ischemic heart disease, he is not having any anginal symptoms.  He is still able to do his activities without much problem.  Continue the aspirin and Effient.    Regarding his hypertension, it seems to be very well controlled with a low-dose of ACE inhibitor's and the beta-blockers.  Continue the same for now    Regarding his hypercholesterolemia, he is tolerating the moderate dose of Lipitor without any side effects.  Continue the same    Regarding his rheumatoid arthritis, he is still taking Rituxan and is followed by the rheumatologist    Regarding the metabolic syndrome I talked to him again about cutting down on the carbohydrate intake.    Regarding the shingles, he is advised to talk to you about the dose of Lyrica.    Overall cardiac status appears stable.  I will see him back in 6 months or sooner.  He may need to undergo a repeat echocardiogram during his next visit to reevaluate for the valvular structures as well as for any pericardial effusion.                          Electronically signed by Millie Rueda MD August 24, 2020 16:13

## 2020-09-08 DIAGNOSIS — E78.00 HYPERCHOLESTEREMIA: ICD-10-CM

## 2020-09-08 DIAGNOSIS — I10 ESSENTIAL HYPERTENSION: ICD-10-CM

## 2020-09-08 RX ORDER — ATORVASTATIN CALCIUM 20 MG/1
20 TABLET, FILM COATED ORAL DAILY
Qty: 30 TABLET | Refills: 0 | OUTPATIENT
Start: 2020-09-08

## 2020-09-08 RX ORDER — LISINOPRIL 10 MG/1
10 TABLET ORAL DAILY
Qty: 30 TABLET | Refills: 0 | OUTPATIENT
Start: 2020-09-08

## 2021-03-04 ENCOUNTER — OFFICE VISIT (OUTPATIENT)
Dept: CARDIOLOGY | Facility: CLINIC | Age: 61
End: 2021-03-04

## 2021-03-04 VITALS
HEIGHT: 72 IN | DIASTOLIC BLOOD PRESSURE: 84 MMHG | BODY MASS INDEX: 29.8 KG/M2 | HEART RATE: 46 BPM | TEMPERATURE: 98.2 F | SYSTOLIC BLOOD PRESSURE: 118 MMHG | WEIGHT: 220 LBS

## 2021-03-04 DIAGNOSIS — I25.9 IHD (ISCHEMIC HEART DISEASE): Primary | ICD-10-CM

## 2021-03-04 DIAGNOSIS — E88.81 METABOLIC SYNDROME: ICD-10-CM

## 2021-03-04 DIAGNOSIS — M05.79 RHEUMATOID ARTHRITIS INVOLVING MULTIPLE SITES WITH POSITIVE RHEUMATOID FACTOR (HCC): ICD-10-CM

## 2021-03-04 DIAGNOSIS — I10 ESSENTIAL HYPERTENSION: ICD-10-CM

## 2021-03-04 DIAGNOSIS — R00.1 BRADYCARDIA, SINUS: ICD-10-CM

## 2021-03-04 DIAGNOSIS — E78.00 HYPERCHOLESTEREMIA: ICD-10-CM

## 2021-03-04 PROCEDURE — 99214 OFFICE O/P EST MOD 30 MIN: CPT | Performed by: INTERNAL MEDICINE

## 2021-03-04 PROCEDURE — 93000 ELECTROCARDIOGRAM COMPLETE: CPT | Performed by: INTERNAL MEDICINE

## 2021-03-04 RX ORDER — ATORVASTATIN CALCIUM 20 MG/1
20 TABLET, FILM COATED ORAL DAILY
Qty: 30 TABLET | Refills: 8 | Status: SHIPPED | OUTPATIENT
Start: 2021-03-04 | End: 2021-11-11 | Stop reason: SDUPTHER

## 2021-03-04 RX ORDER — PRASUGREL 10 MG/1
10 TABLET, FILM COATED ORAL DAILY
Qty: 30 TABLET | Refills: 8 | Status: SHIPPED | OUTPATIENT
Start: 2021-03-04 | End: 2021-11-11 | Stop reason: SDUPTHER

## 2021-03-04 RX ORDER — NEBIVOLOL 5 MG/1
5 TABLET ORAL DAILY
Qty: 30 TABLET | Refills: 8 | Status: SHIPPED | OUTPATIENT
Start: 2021-03-04 | End: 2021-11-11 | Stop reason: SDUPTHER

## 2021-03-04 RX ORDER — LISINOPRIL 10 MG/1
10 TABLET ORAL DAILY
Qty: 30 TABLET | Refills: 8 | Status: SHIPPED | OUTPATIENT
Start: 2021-03-04 | End: 2021-11-11 | Stop reason: SDUPTHER

## 2021-03-04 NOTE — PROGRESS NOTES
"Chief Complaint   Patient presents with   • Follow-up     for cardiac management, doing well   • Med Refill     refills needed on all cardiac meds, 30 days to [facility]   • Labs     PCP checked 2 weeks ago, \" everything looked good\"       CARDIAC COMPLAINTS  fatigue and Cardiac Management        Subjective   Luis Miguel Haley is a 60 y.o. male came in today for his regular follow-up visit.  He has history of hypertension, hypercholesterolemia who also was diagnosed with ischemic heart disease in 2017.  He at that time presented with some chest tightness and shortness of breath.  His cardiac work-up at that time showed ischemia.  Cardiac catheterization showed significant disease involving the LAD for which he had 3 stents placed in the right coronary artery for which he also had multiple stents placed.  He was brought back about few weeks later and had the ramus fixed with few more stents.  His last cardiac work-up was in 2018 which did not show any ischemia.  He was found to have very active extra-articular rheumatoid arthritis.  His rheumatologist did start him on stronger anti-inflammatory medications.  He came today for with no chest pain.  He does feel little fatigue and tired.  He does have some occasional dizziness.  He apparently had some lab works done 2 weeks ago but I do not have the results with me now.  The one I have is from July of last year and at that time his TSH was normal.  His renal function was slightly abnormal with a GFR of 38.  His cholesterol is 137 with the LDL of 89.              Cardiac History  Past Surgical History:   Procedure Laterality Date   • CARDIAC CATHETERIZATION  04/21/2017    85% LAD- 2.5x30,2.5x30,2.5x22 Resolute. 85% RCA- 3.5x38,3.5x18,2.25x12 Resolute. 75% Ramus.   • CARDIAC CATHETERIZATION  05/19/2017    90% Ramus- 2.25x12,2.25x16,2.25x8 Promus   • CARDIOVASCULAR STRESS TEST  04/19/2017    @[facility]- 7 Min, 62% THR. BP- 180/80. Borderline test   • CARDIOVASCULAR STRESS TEST  " 07/25/2017    L.Myoview- EF 50%. Inferior Infarct   • CARDIOVASCULAR STRESS TEST  10/17/2018    L.Cardiolite- EF 57%. Inferior Infarct Vs Diapharamatic attenuation.   • ECHO - CONVERTED  04/20/2017    @[facility]- EF 65%. AV nodular Thickenning   • ECHO - CONVERTED  10/17/2018    EF 55-60%       Current Outpatient Medications   Medication Sig Dispense Refill   • ASPIRIN LOW DOSE 81 MG EC tablet TAKE ONE TABLET BY MOUTH DAILY 30 tablet 7   • atorvastatin (LIPITOR) 20 MG tablet Take 1 tablet by mouth Daily. 30 tablet 8   • Cholecalciferol (VITAMIN D3) 2000 units tablet Take 1 tablet by mouth Daily.     • lisinopril (PRINIVIL,ZESTRIL) 10 MG tablet Take 1 tablet by mouth Daily. 30 tablet 8   • nebivolol (Bystolic) 5 MG tablet Take 1 tablet by mouth Daily. Reduce it to 1/2 tab Daily 30 tablet 8   • nitroglycerin (NITROSTAT) 0.4 MG SL tablet DISSOLVE 1 TAB UNDER TONGUE FOR CHEST PAIN - IF PAIN REMAINS AFTER 5 MIN, CALL 911 AND REPEAT DOSE. MAX 3 TABS IN 15 MINUTES 25 tablet 10   • pantoprazole (PROTONIX) 40 MG EC tablet Take 1 tablet by mouth Daily. 30 tablet 8   • prasugrel (EFFIENT) 10 MG tablet Take 1 tablet by mouth Daily. 30 tablet 8   • pregabalin (LYRICA) 150 MG capsule Take 150 mg by mouth 3 (Three) Times a Day.     • riTUXimab (RITUXAN) 100 MG/10ML solution injection intraveniously every 6 months     • vitamin B-12 (CYANOCOBALAMIN) 1000 MCG tablet Take 1,000 mcg by mouth Daily.       No current facility-administered medications for this visit.        Allergies  :  Methotrexate derivatives and Orencia [abatacept]       Past Medical History:   Diagnosis Date   • Coronary artery disease    • H/O hernia repair    • Heart murmur    • Hypercholesteremia 5/10/2017   • Rheumatoid arthritis (CMS/HCC)        Social History     Socioeconomic History   • Marital status:      Spouse name: Not on file   • Number of children: Not on file   • Years of education: Not on file   • Highest education level: Not on file   Tobacco Use  "  • Smoking status: Former Smoker   • Smokeless tobacco: Never Used   • Tobacco comment: quit 35 years ago   Substance and Sexual Activity   • Alcohol use: Yes     Comment: rarely   • Drug use: No       Family History   Problem Relation Age of Onset   • Diabetes Mother    • Cancer Father    • Hypertension Sister        Review of Systems   Constitution: Positive for malaise/fatigue. Negative for decreased appetite.   HENT: Negative for congestion and sore throat.    Eyes: Negative for blurred vision.   Cardiovascular: Negative for chest pain and palpitations.   Respiratory: Negative for shortness of breath and snoring.    Endocrine: Negative for cold intolerance and heat intolerance.   Hematologic/Lymphatic: Negative for adenopathy. Does not bruise/bleed easily.   Skin: Negative for itching, nail changes and skin cancer.   Musculoskeletal: Negative for arthritis and myalgias.   Gastrointestinal: Negative for abdominal pain, dysphagia and heartburn.   Genitourinary: Negative for bladder incontinence and frequency.   Neurological: Positive for dizziness. Negative for light-headedness, seizures and vertigo.   Psychiatric/Behavioral: Negative for altered mental status.   Allergic/Immunologic: Negative for environmental allergies and hives.       Diabetes- No  Thyroid- normal    Objective     /84   Pulse (!) 46   Temp 98.2 °F (36.8 °C)   Ht 182.9 cm (72\")   Wt 99.8 kg (220 lb)   BMI 29.84 kg/m²     Vitals signs and nursing note reviewed.   Constitutional:       Appearance: Healthy appearance. Not in distress.   Eyes:      Conjunctiva/sclera: Conjunctivae normal.      Pupils: Pupils are equal, round, and reactive to light.   HENT:      Head: Normocephalic.   Neck:      Musculoskeletal: Normal range of motion and neck supple.   Pulmonary:      Effort: Pulmonary effort is normal.      Breath sounds: Normal breath sounds.   Cardiovascular:      PMI at left midclavicular line. Bradycardia present. Regular rhythm.    "   Murmurs: There is a grade 3/6 mid frequency systolic murmur at the apex.   Abdominal:      General: Bowel sounds are normal.      Palpations: Abdomen is soft.   Musculoskeletal: Normal range of motion.   Skin:     General: Skin is warm and dry.   Neurological:      Mental Status: Alert, oriented to person, place, and time and oriented to person, place and time.         ECG 12 Lead    Date/Time: 3/4/2021 4:29 PM  Performed by: Millie Rueda MD  Authorized by: Millie Rueda MD   Comparison: compared with previous ECG from 5/10/2017  Comparison to previous ECG: HR is slower  Rhythm: sinus bradycardia  Rate: bradycardic  QRS axis: normal  Other findings: early repolarization    Clinical impression: non-specific ECG                    Assessment/Plan   Patient's Body mass index is 29.84 kg/m². BMI is above normal parameters. Recommendations include: exercise counseling and nutrition counseling.     Diagnoses and all orders for this visit:    1. IHD (ischemic heart disease) (Primary)  -     nebivolol (Bystolic) 5 MG tablet; Take 1 tablet by mouth Daily. Reduce it to 1/2 tab Daily  Dispense: 30 tablet; Refill: 8  -     prasugrel (EFFIENT) 10 MG tablet; Take 1 tablet by mouth Daily.  Dispense: 30 tablet; Refill: 8    2. Essential hypertension  -     nebivolol (Bystolic) 5 MG tablet; Take 1 tablet by mouth Daily. Reduce it to 1/2 tab Daily  Dispense: 30 tablet; Refill: 8  -     lisinopril (PRINIVIL,ZESTRIL) 10 MG tablet; Take 1 tablet by mouth Daily.  Dispense: 30 tablet; Refill: 8    3. Hypercholesteremia  -     atorvastatin (LIPITOR) 20 MG tablet; Take 1 tablet by mouth Daily.  Dispense: 30 tablet; Refill: 8    4. Metabolic syndrome    5. Rheumatoid arthritis involving multiple sites with positive rheumatoid factor (CMS/HCC)    6. Bradycardia, sinus    At baseline he seems to be little bradycardic.  His blood pressure is running lower limit of normal.  His clinical examination reveals a BMI still around 30.   He does have a short systolic murmur at the mitral area.  He has normal peripheral pulse and no pedal edema.    Regarding his ischemic heart disease, he has undergone multiple stenting in his LAD RCA and ramus.  At this time he has no anginal symptoms.  Continue the Effient as well as the aspirin.    Regarding his blood pressure, it seems to be running little low.  I advised him to reduce the dose of Bystolic to 2.5 mg once a day and continue the lisinopril.  Advised him to increase his fluid intake    Regarding his hypercholesterolemia, he is tolerating the Lipitor without any problem.  Continue the same.  He needs his lipids and LFT's checked    Regarding his metabolic syndrome, talked to him again about the low-carb diet.    Regarding his rheumatoid arthritis, he is now taking Rituxan without any complication continue the same    Regarding his bradycardia, he is not very symptomatic.  I am hoping by reducing the Bystolic the heart rate will go back to the baseline.  If he develops more any symptoms then he will have a Holter monitor placed.    I would really appreciate, if you can send me copy of his labs    I will see him back in 6 months or sooner if needed.                    Electronically signed by Millie Rueda MD March 4, 2021 16:23 EST

## 2021-04-12 DIAGNOSIS — I25.9 IHD (ISCHEMIC HEART DISEASE): ICD-10-CM

## 2021-04-13 RX ORDER — ASPIRIN 81 MG/1
TABLET, COATED ORAL
Qty: 30 TABLET | Refills: 6 | Status: SHIPPED | OUTPATIENT
Start: 2021-04-13 | End: 2021-11-08

## 2021-11-05 DIAGNOSIS — I25.9 IHD (ISCHEMIC HEART DISEASE): ICD-10-CM

## 2021-11-08 RX ORDER — ASPIRIN 81 MG/1
TABLET, COATED ORAL
Qty: 30 TABLET | Refills: 6 | Status: SHIPPED | OUTPATIENT
Start: 2021-11-08 | End: 2022-06-07

## 2021-11-11 ENCOUNTER — OFFICE VISIT (OUTPATIENT)
Dept: CARDIOLOGY | Facility: CLINIC | Age: 61
End: 2021-11-11

## 2021-11-11 VITALS
DIASTOLIC BLOOD PRESSURE: 94 MMHG | HEIGHT: 72 IN | TEMPERATURE: 97.5 F | HEART RATE: 56 BPM | WEIGHT: 213 LBS | BODY MASS INDEX: 28.85 KG/M2 | SYSTOLIC BLOOD PRESSURE: 154 MMHG

## 2021-11-11 DIAGNOSIS — I10 ESSENTIAL HYPERTENSION: ICD-10-CM

## 2021-11-11 DIAGNOSIS — I25.6 SILENT MYOCARDIAL ISCHEMIA: ICD-10-CM

## 2021-11-11 DIAGNOSIS — E78.00 HYPERCHOLESTEREMIA: ICD-10-CM

## 2021-11-11 DIAGNOSIS — Z86.19 HISTORY OF SHINGLES: ICD-10-CM

## 2021-11-11 DIAGNOSIS — E88.81 METABOLIC SYNDROME: ICD-10-CM

## 2021-11-11 DIAGNOSIS — I25.9 IHD (ISCHEMIC HEART DISEASE): Primary | ICD-10-CM

## 2021-11-11 DIAGNOSIS — M05.79 RHEUMATOID ARTHRITIS INVOLVING MULTIPLE SITES WITH POSITIVE RHEUMATOID FACTOR (HCC): ICD-10-CM

## 2021-11-11 DIAGNOSIS — R00.1 BRADYCARDIA, SINUS: ICD-10-CM

## 2021-11-11 PROCEDURE — 99214 OFFICE O/P EST MOD 30 MIN: CPT | Performed by: INTERNAL MEDICINE

## 2021-11-11 RX ORDER — PRASUGREL 10 MG/1
10 TABLET, FILM COATED ORAL DAILY
Qty: 90 TABLET | Refills: 3 | Status: SHIPPED | OUTPATIENT
Start: 2021-11-11 | End: 2022-07-26 | Stop reason: SDUPTHER

## 2021-11-11 RX ORDER — AMLODIPINE BESYLATE 5 MG/1
5 TABLET ORAL DAILY
Qty: 90 TABLET | Refills: 3 | Status: SHIPPED | OUTPATIENT
Start: 2021-11-11 | End: 2022-07-26 | Stop reason: SDUPTHER

## 2021-11-11 RX ORDER — LISINOPRIL 10 MG/1
10 TABLET ORAL DAILY
Qty: 30 TABLET | Refills: 8 | Status: SHIPPED | OUTPATIENT
Start: 2021-11-11 | End: 2021-11-11 | Stop reason: SDUPTHER

## 2021-11-11 RX ORDER — ATORVASTATIN CALCIUM 20 MG/1
20 TABLET, FILM COATED ORAL DAILY
Qty: 90 TABLET | Refills: 3 | Status: SHIPPED | OUTPATIENT
Start: 2021-11-11 | End: 2022-07-26 | Stop reason: SDUPTHER

## 2021-11-11 RX ORDER — PRASUGREL 10 MG/1
10 TABLET, FILM COATED ORAL DAILY
Qty: 30 TABLET | Refills: 8 | Status: SHIPPED | OUTPATIENT
Start: 2021-11-11 | End: 2021-11-11 | Stop reason: SDUPTHER

## 2021-11-11 RX ORDER — NEBIVOLOL 5 MG/1
5 TABLET ORAL DAILY
Qty: 90 TABLET | Refills: 3 | Status: SHIPPED | OUTPATIENT
Start: 2021-11-11 | End: 2022-03-08

## 2021-11-11 RX ORDER — NEBIVOLOL 5 MG/1
5 TABLET ORAL DAILY
Qty: 30 TABLET | Refills: 8 | Status: SHIPPED | OUTPATIENT
Start: 2021-11-11 | End: 2021-11-11 | Stop reason: SDUPTHER

## 2021-11-11 RX ORDER — ATORVASTATIN CALCIUM 20 MG/1
20 TABLET, FILM COATED ORAL DAILY
Qty: 30 TABLET | Refills: 8 | Status: SHIPPED | OUTPATIENT
Start: 2021-11-11 | End: 2021-11-11 | Stop reason: SDUPTHER

## 2021-11-11 RX ORDER — LISINOPRIL 10 MG/1
10 TABLET ORAL DAILY
Qty: 90 TABLET | Refills: 3 | Status: SHIPPED | OUTPATIENT
Start: 2021-11-11 | End: 2022-07-26

## 2021-11-11 NOTE — PROGRESS NOTES
Chief Complaint   Patient presents with   • Follow-up     for cardiac management, doing good   • Med Refill     refills needed on cardiac meds. 90 days to [facility] in [place].    • Hypertension     systolic has been running around 160, diastolic in the 80's to 90's over the past 1-2 months   • Labs     PCP checks routine labs every 3 months       CARDIAC COMPLAINTS  Cardiac Management        Subjective   Luis Miguel Haley is a 61 y.o. male came in today for his regular follow-up visit.  He has history of hypertension, hypercholesterolemia was diagnosed with ischemic heart disease in 2017 when he underwent stenting of the LAD, right coronary artery and later underwent stenting of the ramus.  His last cardiac work-up was in 2018 which was negative.  He came today for the follow-up visit.  He denies having any chest pain.  He does feel little tired and fatigued.  He apparently had labs done about 3 months ago.  At this time I do not have the results I have one from July.  At that time his renal function was upper limit of normal.  His cholesterol is 140 with the LDL of 90.  He has not taken any sublingual nitroglycerin.              Cardiac History  Past Surgical History:   Procedure Laterality Date   • CARDIAC CATHETERIZATION  04/21/2017    85% LAD- 2.5x30,2.5x30,2.5x22 Resolute. 85% RCA- 3.5x38,3.5x18,2.25x12 Resolute. 75% Ramus.   • CARDIAC CATHETERIZATION  05/19/2017    90% Ramus- 2.25x12,2.25x16,2.25x8 Promus   • CARDIOVASCULAR STRESS TEST  04/19/2017    @[facility]. R.Stress- 7 Min, 62% THR. BP- 180/80. Borderline test   • CARDIOVASCULAR STRESS TEST  07/25/2017    L.Myoview- EF 50%. Inferior Infarct   • CARDIOVASCULAR STRESS TEST  10/17/2018    L.Cardiolite- EF 57%. Inferior Infarct Vs Diapharamatic attenuation.   • ECHO - CONVERTED  04/20/2017    @[facility]- EF 65%. AV nodular Thickenning   • ECHO - CONVERTED  10/17/2018    EF 55-60%       Current Outpatient Medications   Medication Sig Dispense Refill   • Aspirin Low Dose  81 MG EC tablet TAKE ONE TABLET BY MOUTH DAILY 30 tablet 6   • atorvastatin (LIPITOR) 20 MG tablet Take 1 tablet by mouth Daily. 90 tablet 3   • Cholecalciferol (VITAMIN D3) 2000 units tablet Take 1 tablet by mouth Daily.     • lisinopril (PRINIVIL,ZESTRIL) 10 MG tablet Take 1 tablet by mouth Daily. 90 tablet 3   • nebivolol (Bystolic) 5 MG tablet Take 1 tablet by mouth Daily. Reduce it to 1/2 tab Daily 90 tablet 3   • nitroglycerin (NITROSTAT) 0.4 MG SL tablet DISSOLVE 1 TAB UNDER TONGUE FOR CHEST PAIN - IF PAIN REMAINS AFTER 5 MIN, CALL 911 AND REPEAT DOSE. MAX 3 TABS IN 15 MINUTES 25 tablet 10   • pantoprazole (PROTONIX) 40 MG EC tablet Take 1 tablet by mouth Daily. 30 tablet 8   • prasugrel (EFFIENT) 10 MG tablet Take 1 tablet by mouth Daily. 90 tablet 3   • pregabalin (LYRICA) 150 MG capsule Take 150 mg by mouth 3 (Three) Times a Day.     • riTUXimab (RITUXAN) 100 MG/10ML solution injection intraveniously every 6 months     • vitamin B-12 (CYANOCOBALAMIN) 1000 MCG tablet Take 1,000 mcg by mouth Daily.     • amLODIPine (NORVASC) 5 MG tablet Take 1 tablet by mouth Daily. 90 tablet 3     No current facility-administered medications for this visit.       Allergies  :  Methotrexate derivatives and Orencia [abatacept]       Past Medical History:   Diagnosis Date   • Coronary artery disease    • H/O hernia repair    • Heart murmur    • Hypercholesteremia 5/10/2017   • Rheumatoid arthritis (HCC)        Social History     Socioeconomic History   • Marital status:    Tobacco Use   • Smoking status: Former Smoker   • Smokeless tobacco: Never Used   • Tobacco comment: quit 35 years ago   Substance and Sexual Activity   • Alcohol use: Yes     Comment: rarely   • Drug use: No       Family History   Problem Relation Age of Onset   • Diabetes Mother    • Cancer Father    • Hypertension Sister        Review of Systems   Constitutional: Negative for decreased appetite and malaise/fatigue.   HENT: Negative for congestion  "and sore throat.    Eyes: Negative for blurred vision.   Cardiovascular: Negative for chest pain and palpitations.   Respiratory: Negative for shortness of breath and snoring.    Endocrine: Negative for cold intolerance and heat intolerance.   Hematologic/Lymphatic: Negative for adenopathy. Does not bruise/bleed easily.   Skin: Negative for itching, nail changes and skin cancer.   Musculoskeletal: Negative for arthritis and myalgias.   Gastrointestinal: Negative for abdominal pain, dysphagia and heartburn.   Genitourinary: Negative for bladder incontinence and frequency.   Neurological: Negative for dizziness, light-headedness, seizures and vertigo.   Psychiatric/Behavioral: Negative for altered mental status.   Allergic/Immunologic: Negative for environmental allergies and hives.       Diabetes- No  Thyroid- normal    Objective     /94   Pulse 56   Temp 97.5 °F (36.4 °C)   Ht 182.9 cm (72\")   Wt 96.6 kg (213 lb)   BMI 28.89 kg/m²     Vitals and nursing note reviewed.   Constitutional:       Appearance: Healthy appearance. Not in distress.   Eyes:      Conjunctiva/sclera: Conjunctivae normal.      Pupils: Pupils are equal, round, and reactive to light.   HENT:      Head: Normocephalic.   Pulmonary:      Effort: Pulmonary effort is normal.      Breath sounds: Normal breath sounds.   Cardiovascular:      PMI at left midclavicular line. Normal rate. Regular rhythm.      Murmurs: There is a systolic murmur.   Abdominal:      General: Bowel sounds are normal.      Palpations: Abdomen is soft.   Musculoskeletal: Normal range of motion.      Cervical back: Normal range of motion and neck supple. Skin:     General: Skin is warm and dry.   Neurological:      Mental Status: Alert, oriented to person, place, and time and oriented to person, place and time.       Procedures            Assessment/Plan   Patient's Body mass index is 28.89 kg/m². indicating that he is overweight (BMI 25-29.9). Obesity-related health " conditions include the following: hypertension, coronary heart disease and dyslipidemias. Obesity is unchanged. BMI is is above average; BMI management plan is completed. We discussed portion control and increasing exercise..     Diagnoses and all orders for this visit:    1. IHD (ischemic heart disease) (Primary)  -     Discontinue: nebivolol (Bystolic) 5 MG tablet; Take 1 tablet by mouth Daily. Reduce it to 1/2 tab Daily  Dispense: 30 tablet; Refill: 8  -     Discontinue: prasugrel (EFFIENT) 10 MG tablet; Take 1 tablet by mouth Daily.  Dispense: 30 tablet; Refill: 8  -     amLODIPine (NORVASC) 5 MG tablet; Take 1 tablet by mouth Daily.  Dispense: 90 tablet; Refill: 3  -     Stress Test With Myocardial Perfusion One Day; Future  -     Adult Transthoracic Echo Complete W/ Cont if Necessary Per Protocol; Future  -     nebivolol (Bystolic) 5 MG tablet; Take 1 tablet by mouth Daily. Reduce it to 1/2 tab Daily  Dispense: 90 tablet; Refill: 3  -     prasugrel (EFFIENT) 10 MG tablet; Take 1 tablet by mouth Daily.  Dispense: 90 tablet; Refill: 3    2. Essential hypertension  -     Discontinue: lisinopril (PRINIVIL,ZESTRIL) 10 MG tablet; Take 1 tablet by mouth Daily.  Dispense: 30 tablet; Refill: 8  -     Discontinue: nebivolol (Bystolic) 5 MG tablet; Take 1 tablet by mouth Daily. Reduce it to 1/2 tab Daily  Dispense: 30 tablet; Refill: 8  -     amLODIPine (NORVASC) 5 MG tablet; Take 1 tablet by mouth Daily.  Dispense: 90 tablet; Refill: 3  -     lisinopril (PRINIVIL,ZESTRIL) 10 MG tablet; Take 1 tablet by mouth Daily.  Dispense: 90 tablet; Refill: 3  -     nebivolol (Bystolic) 5 MG tablet; Take 1 tablet by mouth Daily. Reduce it to 1/2 tab Daily  Dispense: 90 tablet; Refill: 3    3. Hypercholesteremia  -     Discontinue: atorvastatin (LIPITOR) 20 MG tablet; Take 1 tablet by mouth Daily.  Dispense: 30 tablet; Refill: 8  -     atorvastatin (LIPITOR) 20 MG tablet; Take 1 tablet by mouth Daily.  Dispense: 90 tablet; Refill:  3    4. Bradycardia, sinus  -     Adult Transthoracic Echo Complete W/ Cont if Necessary Per Protocol; Future    5. Rheumatoid arthritis involving multiple sites with positive rheumatoid factor (HCC)    6. Metabolic syndrome    7. History of shingles    8. Silent myocardial ischemia  -     Stress Test With Myocardial Perfusion One Day; Future    At baseline his heart rate is stable but the blood pressure is gone up to 154/94.  His clinical examination reveals a BMI around 29.  His cardiovascular examination is unremarkable.    Regarding his ischemic heart disease, at this time he is not having any anginal symptoms.  His blood pressure though has been going up for the last few months.  The possibility of silent myocardial ischemia has to be ruled out.  At this time I continued his Bystolic, Effient along with the aspirin.  I schedule him to undergo a stress test in the form of Lexiscan to rule out ischemia causing the abnormal changes.  I also advised him to undergo an echocardiogram to reevaluate the LV function, look for any wall motion abnormality and also the valvular structures.    Regarding the hypertension, he is already on Prinivil and Bystolic.  I added amlodipine at 5 mg once a day.    Regarding his hypercholesterolemia, continue Lipitor at 20 mg.  He is advised to check his LFT again.    Regarding his chronic sinus bradycardia, is completely asymptomatic at this time continue Bystolic    Regarding his rheumatoid arthritis, he is now taking Rituxan.  He says advised to talk with the rheumatologist    Regarding the metabolic syndrome, talked to him again about the low-carb diet.    Regarding his shingles, he still gets the pain and is doing better with Lyrica.  Continue the same    Based on the results of the stress and echo, further recommendations will be made.                    Electronically signed by Millie Rueda MD November 11, 2021 16:26 EST

## 2021-12-15 ENCOUNTER — HOSPITAL ENCOUNTER (OUTPATIENT)
Dept: CARDIOLOGY | Facility: HOSPITAL | Age: 61
Discharge: HOME | End: 2021-12-15

## 2021-12-15 VITALS — BODY MASS INDEX: 28.85 KG/M2 | HEIGHT: 72 IN | WEIGHT: 212.96 LBS

## 2021-12-15 DIAGNOSIS — I25.6 SILENT MYOCARDIAL ISCHEMIA: ICD-10-CM

## 2021-12-15 DIAGNOSIS — I25.9 IHD (ISCHEMIC HEART DISEASE): ICD-10-CM

## 2021-12-15 DIAGNOSIS — R00.1 BRADYCARDIA, SINUS: ICD-10-CM

## 2021-12-15 LAB
AORTIC DIMENSIONLESS INDEX: 0.5 (DI)
BH CV ECHO MEAS - ACS: 1 CM
BH CV ECHO MEAS - AO MAX PG (FULL): 19.8 MMHG
BH CV ECHO MEAS - AO MAX PG: 26.6 MMHG
BH CV ECHO MEAS - AO MEAN PG (FULL): 10 MMHG
BH CV ECHO MEAS - AO MEAN PG: 13 MMHG
BH CV ECHO MEAS - AO ROOT AREA (BSA CORRECTED): 1.3
BH CV ECHO MEAS - AO ROOT AREA: 6.2 CM^2
BH CV ECHO MEAS - AO ROOT DIAM: 2.8 CM
BH CV ECHO MEAS - AO V2 MAX: 258 CM/SEC
BH CV ECHO MEAS - AO V2 MEAN: 163 CM/SEC
BH CV ECHO MEAS - AO V2 VTI: 63.7 CM
BH CV ECHO MEAS - AVA(I,A): 1.8 CM^2
BH CV ECHO MEAS - AVA(I,D): 1.8 CM^2
BH CV ECHO MEAS - AVA(V,A): 1.8 CM^2
BH CV ECHO MEAS - AVA(V,D): 1.8 CM^2
BH CV ECHO MEAS - BSA(HAYCOCK): 2.2 M^2
BH CV ECHO MEAS - BSA: 2.2 M^2
BH CV ECHO MEAS - BZI_BMI: 29.2 KILOGRAMS/M^2
BH CV ECHO MEAS - BZI_METRIC_HEIGHT: 182.9 CM
BH CV ECHO MEAS - BZI_METRIC_WEIGHT: 97.5 KG
BH CV ECHO MEAS - EDV(CUBED): 138.2 ML
BH CV ECHO MEAS - EDV(TEICH): 127.8 ML
BH CV ECHO MEAS - EF(CUBED): 74 %
BH CV ECHO MEAS - EF(MOD-BP): 65 %
BH CV ECHO MEAS - EF(TEICH): 65.5 %
BH CV ECHO MEAS - EF_3D-VOL: 56 %
BH CV ECHO MEAS - ESV(CUBED): 35.9 ML
BH CV ECHO MEAS - ESV(TEICH): 44.1 ML
BH CV ECHO MEAS - FS: 36.2 %
BH CV ECHO MEAS - IVS/LVPW: 1.1
BH CV ECHO MEAS - IVSD: 1.1 CM
BH CV ECHO MEAS - LA DIMENSION: 3.9 CM
BH CV ECHO MEAS - LA/AO: 1.4
BH CV ECHO MEAS - LAT PEAK E' VEL: 13.4 CM/SEC
BH CV ECHO MEAS - LV IVRT: 0.1 SEC
BH CV ECHO MEAS - LV MASS(C)D: 202.7 GRAMS
BH CV ECHO MEAS - LV MASS(C)DI: 92.3 GRAMS/M^2
BH CV ECHO MEAS - LV MAX PG: 6.9 MMHG
BH CV ECHO MEAS - LV MEAN PG: 3 MMHG
BH CV ECHO MEAS - LV V1 MAX: 131 CM/SEC
BH CV ECHO MEAS - LV V1 MEAN: 76.3 CM/SEC
BH CV ECHO MEAS - LV V1 VTI: 33.7 CM
BH CV ECHO MEAS - LVIDD: 5.2 CM
BH CV ECHO MEAS - LVIDS: 3.3 CM
BH CV ECHO MEAS - LVOT AREA (M): 3.5 CM^2
BH CV ECHO MEAS - LVOT AREA: 3.5 CM^2
BH CV ECHO MEAS - LVOT DIAM: 2.1 CM
BH CV ECHO MEAS - LVPWD: 1 CM
BH CV ECHO MEAS - MED PEAK E' VEL: 9.3 CM/SEC
BH CV ECHO MEAS - MV A MAX VEL: 71.7 CM/SEC
BH CV ECHO MEAS - MV DEC SLOPE: 551 CM/SEC^2
BH CV ECHO MEAS - MV DEC TIME: 0.24 SEC
BH CV ECHO MEAS - MV E MAX VEL: 103 CM/SEC
BH CV ECHO MEAS - MV E/A: 1.4
BH CV ECHO MEAS - MV MAX PG: 4 MMHG
BH CV ECHO MEAS - MV MEAN PG: 1 MMHG
BH CV ECHO MEAS - MV P1/2T MAX VEL: 118 CM/SEC
BH CV ECHO MEAS - MV P1/2T: 62.7 MSEC
BH CV ECHO MEAS - MV V2 MAX: 100 CM/SEC
BH CV ECHO MEAS - MV V2 MEAN: 47.6 CM/SEC
BH CV ECHO MEAS - MV V2 VTI: 39.5 CM
BH CV ECHO MEAS - MVA P1/2T LCG: 1.9 CM^2
BH CV ECHO MEAS - MVA(P1/2T): 3.5 CM^2
BH CV ECHO MEAS - MVA(VTI): 3 CM^2
BH CV ECHO MEAS - PA MAX PG (FULL): 4.4 MMHG
BH CV ECHO MEAS - PA MAX PG: 6.7 MMHG
BH CV ECHO MEAS - PA MEAN PG (FULL): 2 MMHG
BH CV ECHO MEAS - PA MEAN PG: 3 MMHG
BH CV ECHO MEAS - PA V2 MAX: 129 CM/SEC
BH CV ECHO MEAS - PA V2 MEAN: 84.8 CM/SEC
BH CV ECHO MEAS - PA V2 VTI: 30.6 CM
BH CV ECHO MEAS - RAP SYSTOLE: 10 MMHG
BH CV ECHO MEAS - RV MAX PG: 2.3 MMHG
BH CV ECHO MEAS - RV MEAN PG: 1 MMHG
BH CV ECHO MEAS - RV V1 MAX: 75.7 CM/SEC
BH CV ECHO MEAS - RV V1 MEAN: 48.9 CM/SEC
BH CV ECHO MEAS - RV V1 VTI: 19.7 CM
BH CV ECHO MEAS - RVDD: 3.3 CM
BH CV ECHO MEAS - RVSP: 26 MMHG
BH CV ECHO MEAS - SI(AO): 178.5 ML/M^2
BH CV ECHO MEAS - SI(CUBED): 46.5 ML/M^2
BH CV ECHO MEAS - SI(LVOT): 53.1 ML/M^2
BH CV ECHO MEAS - SI(TEICH): 38.1 ML/M^2
BH CV ECHO MEAS - SV(AO): 392.2 ML
BH CV ECHO MEAS - SV(CUBED): 102.3 ML
BH CV ECHO MEAS - SV(LVOT): 116.7 ML
BH CV ECHO MEAS - SV(TEICH): 83.7 ML
BH CV ECHO MEAS - TR MAX VEL: 200 CM/SEC
BH CV ECHO MEASUREMENTS AVERAGE E/E' RATIO: 9.07
BH CV REST NUCLEAR ISOTOPE DOSE: 10 MCI
BH CV STRESS COMMENTS STAGE 1: NORMAL
BH CV STRESS DOSE REGADENOSON STAGE 1: 0.4
BH CV STRESS DURATION MIN STAGE 1: 0
BH CV STRESS DURATION SEC STAGE 1: 10
BH CV STRESS NUCLEAR ISOTOPE DOSE: 30 MCI
BH CV STRESS PROTOCOL 1: NORMAL
BH CV STRESS RECOVERY BP: NORMAL MMHG
BH CV STRESS RECOVERY HR: 64 BPM
BH CV STRESS STAGE 1: 1
IVRT: 100 MSEC
LV EF NUC BP: 57 %
MAXIMAL PREDICTED HEART RATE: 160 BPM
MAXIMAL PREDICTED HEART RATE: 160 BPM
PERCENT MAX PREDICTED HR: 48.75 %
STRESS BASELINE BP: NORMAL MMHG
STRESS BASELINE HR: 50 BPM
STRESS PERCENT HR: 57 %
STRESS POST PEAK BP: NORMAL MMHG
STRESS POST PEAK HR: 78 BPM
STRESS TARGET HR: 136 BPM
STRESS TARGET HR: 136 BPM

## 2021-12-15 PROCEDURE — 78452 HT MUSCLE IMAGE SPECT MULT: CPT | Performed by: INTERNAL MEDICINE

## 2021-12-15 PROCEDURE — 93306 TTE W/DOPPLER COMPLETE: CPT | Performed by: INTERNAL MEDICINE

## 2021-12-15 PROCEDURE — A9500 TC99M SESTAMIBI: HCPCS | Performed by: INTERNAL MEDICINE

## 2021-12-15 PROCEDURE — 0 TECHNETIUM SESTAMIBI: Performed by: INTERNAL MEDICINE

## 2021-12-15 PROCEDURE — 78452 HT MUSCLE IMAGE SPECT MULT: CPT

## 2021-12-15 PROCEDURE — 93018 CV STRESS TEST I&R ONLY: CPT | Performed by: INTERNAL MEDICINE

## 2021-12-15 PROCEDURE — 25010000002 REGADENOSON 0.4 MG/5ML SOLUTION: Performed by: INTERNAL MEDICINE

## 2021-12-15 PROCEDURE — 93306 TTE W/DOPPLER COMPLETE: CPT

## 2021-12-15 PROCEDURE — 93017 CV STRESS TEST TRACING ONLY: CPT

## 2021-12-15 RX ADMIN — REGADENOSON 0.4 MG: 0.08 INJECTION, SOLUTION INTRAVENOUS at 11:05

## 2021-12-15 RX ADMIN — TECHNETIUM TC 99M SESTAMIBI 1 DOSE: 1 INJECTION INTRAVENOUS at 11:05

## 2021-12-15 RX ADMIN — TECHNETIUM TC 99M SESTAMIBI 1 DOSE: 1 INJECTION INTRAVENOUS at 08:41

## 2022-03-07 DIAGNOSIS — I25.9 IHD (ISCHEMIC HEART DISEASE): ICD-10-CM

## 2022-03-07 DIAGNOSIS — I10 ESSENTIAL HYPERTENSION: ICD-10-CM

## 2022-03-08 RX ORDER — NEBIVOLOL 5 MG/1
TABLET ORAL
Qty: 15 TABLET | Refills: 11 | Status: SHIPPED | OUTPATIENT
Start: 2022-03-08 | End: 2022-07-26 | Stop reason: SDUPTHER

## 2022-06-06 DIAGNOSIS — I25.9 IHD (ISCHEMIC HEART DISEASE): ICD-10-CM

## 2022-06-07 RX ORDER — ASPIRIN 81 MG/1
TABLET, COATED ORAL
Qty: 30 TABLET | Refills: 0 | Status: SHIPPED | OUTPATIENT
Start: 2022-06-07 | End: 2022-07-07

## 2022-07-07 DIAGNOSIS — I25.9 IHD (ISCHEMIC HEART DISEASE): ICD-10-CM

## 2022-07-07 RX ORDER — ASPIRIN 81 MG/1
TABLET, COATED ORAL
Qty: 30 TABLET | Refills: 0 | Status: SHIPPED | OUTPATIENT
Start: 2022-07-07 | End: 2022-08-15

## 2022-07-26 ENCOUNTER — OFFICE VISIT (OUTPATIENT)
Dept: CARDIOLOGY | Facility: CLINIC | Age: 62
End: 2022-07-26

## 2022-07-26 VITALS
HEART RATE: 56 BPM | DIASTOLIC BLOOD PRESSURE: 68 MMHG | BODY MASS INDEX: 26.98 KG/M2 | WEIGHT: 199.2 LBS | SYSTOLIC BLOOD PRESSURE: 108 MMHG | HEIGHT: 72 IN

## 2022-07-26 DIAGNOSIS — R09.89 LEFT CAROTID BRUIT: ICD-10-CM

## 2022-07-26 DIAGNOSIS — E88.81 METABOLIC SYNDROME: ICD-10-CM

## 2022-07-26 DIAGNOSIS — M05.79 RHEUMATOID ARTHRITIS INVOLVING MULTIPLE SITES WITH POSITIVE RHEUMATOID FACTOR: ICD-10-CM

## 2022-07-26 DIAGNOSIS — I25.9 IHD (ISCHEMIC HEART DISEASE): Primary | ICD-10-CM

## 2022-07-26 DIAGNOSIS — I10 ESSENTIAL HYPERTENSION: ICD-10-CM

## 2022-07-26 DIAGNOSIS — E78.00 HYPERCHOLESTEREMIA: ICD-10-CM

## 2022-07-26 DIAGNOSIS — R06.02 SHORTNESS OF BREATH: ICD-10-CM

## 2022-07-26 DIAGNOSIS — R42 DIZZINESS: ICD-10-CM

## 2022-07-26 PROCEDURE — 99214 OFFICE O/P EST MOD 30 MIN: CPT | Performed by: INTERNAL MEDICINE

## 2022-07-26 RX ORDER — PREGABALIN 300 MG/1
300 CAPSULE ORAL 2 TIMES DAILY
COMMUNITY

## 2022-07-26 RX ORDER — NEBIVOLOL 5 MG/1
2.5 TABLET ORAL DAILY
Qty: 15 TABLET | Refills: 11 | Status: SHIPPED | OUTPATIENT
Start: 2022-07-26 | End: 2023-02-14 | Stop reason: SDUPTHER

## 2022-07-26 RX ORDER — ATORVASTATIN CALCIUM 20 MG/1
20 TABLET, FILM COATED ORAL DAILY
Qty: 90 TABLET | Refills: 3 | Status: SHIPPED | OUTPATIENT
Start: 2022-07-26 | End: 2023-02-14 | Stop reason: SDUPTHER

## 2022-07-26 RX ORDER — LISINOPRIL 5 MG/1
5 TABLET ORAL DAILY
Qty: 90 TABLET | Refills: 3 | Status: SHIPPED | OUTPATIENT
Start: 2022-07-26 | End: 2023-02-14 | Stop reason: SDUPTHER

## 2022-07-26 RX ORDER — PRASUGREL 10 MG/1
10 TABLET, FILM COATED ORAL DAILY
Qty: 90 TABLET | Refills: 3 | Status: SHIPPED | OUTPATIENT
Start: 2022-07-26 | End: 2023-02-14 | Stop reason: SDUPTHER

## 2022-07-26 RX ORDER — AMLODIPINE BESYLATE 5 MG/1
5 TABLET ORAL DAILY
Qty: 90 TABLET | Refills: 3 | Status: SHIPPED | OUTPATIENT
Start: 2022-07-26 | End: 2023-02-14 | Stop reason: SDUPTHER

## 2022-07-26 NOTE — PROGRESS NOTES
Chief Complaint   Patient presents with   • Follow-up     Cardiac management   • Lab     Last labs 4 weeks ago per PCP.  He reports having Covid about 3 weeks ago.   • Dizziness     Feels could be related to Covid.    • Shortness of Breath     Notices with minimal exertion.    • Med Refill     Needs refills on cardiac medications except Bystolic-90 day.   • Weight loss     Had been watching diet closer and from covid.       CARDIAC COMPLAINTS  dyspnea and Dizziness        Subjective   Luis Miguel Haley is a 62 y.o. male came in today for his regular follow-up visit.  He has history of hypertension, hypercholesterolemia and significant rheumatoid arthritis with extra-articular manifestation.  He was diagnosed with ischemic heart disease in 2017 where he underwent stenting of the LAD, RCA as well as ramus.  He did undergo a stress test in December of last year and found to have a small area of anterior wall ischemia.  His echocardiogram did show some mild to moderate aortic valvular stenosis.  Since he was not having any significant anginal discomfort, it was decided to manage him medically.  He came today stating that he apparently was diagnosed with COVID about 3 weeks ago.  He was having symptoms of cough and congestion.  He was treated with steroids and antibiotic.  Since then he has been noticing shortness of breath occurring on exertion and also dizziness.  He had labs done but I do not have any results.  He also has gone on a strict diet and lost weight.              Cardiac History  Past Surgical History:   Procedure Laterality Date   • CARDIAC CATHETERIZATION  04/21/2017    85% LAD- 2.5x30,2.5x30,2.5x22 Resolute. 85% RCA- 3.5x38,3.5x18,2.25x12 Resolute. 75% Ramus.   • CARDIAC CATHETERIZATION  05/19/2017    90% Ramus- 2.25x12,2.25x16,2.25x8 Promus   • CARDIOVASCULAR STRESS TEST  04/19/2017    @[facility]. R.Stress- 7 Min, 62% THR. BP- 180/80. Borderline test   • CARDIOVASCULAR STRESS TEST  07/25/2017    L.Myoview- EF 50%.  Inferior Infarct   • CARDIOVASCULAR STRESS TEST  10/17/2018    L.Cardiolite- EF 57%. Inferior Infarct Vs Diapharamatic attenuation.   • CARDIOVASCULAR STRESS TEST  12/15/2021    Lexiscan- EF 57%. Small Anterior Ischemia   • ECHO - CONVERTED  04/20/2017    @[facility]- EF 65%. AV nodular Thickenning   • ECHO - CONVERTED  10/17/2018    EF 55-60%   • ECHO - CONVERTED  12/15/2021    EF 60%. LA- 3.9 Cm. EDUARD- 1.8 Cm2. Mild MR. RVSP- 26 mmHg       Current Outpatient Medications   Medication Sig Dispense Refill   • amLODIPine (NORVASC) 5 MG tablet Take 1 tablet by mouth Daily. 90 tablet 3   • Aspirin Low Dose 81 MG EC tablet TAKE ONE TABLET BY MOUTH DAILY 30 tablet 0   • atorvastatin (LIPITOR) 20 MG tablet Take 1 tablet by mouth Daily. 90 tablet 3   • Cholecalciferol (VITAMIN D3) 2000 units tablet Take 1 tablet by mouth Daily.     • nebivolol (BYSTOLIC) 5 MG tablet Take 0.5 tablets by mouth Daily. 15 tablet 11   • nitroglycerin (NITROSTAT) 0.4 MG SL tablet DISSOLVE 1 TAB UNDER TONGUE FOR CHEST PAIN - IF PAIN REMAINS AFTER 5 MIN, CALL 911 AND REPEAT DOSE. MAX 3 TABS IN 15 MINUTES 25 tablet 10   • pantoprazole (PROTONIX) 40 MG EC tablet Take 1 tablet by mouth Daily. 30 tablet 8   • prasugrel (EFFIENT) 10 MG tablet Take 1 tablet by mouth Daily. 90 tablet 3   • pregabalin (Lyrica) 300 MG capsule Take 300 mg by mouth 2 (Two) Times a Day.     • riTUXimab (RITUXAN) 100 MG/10ML solution injection intraveniously every 6 months     • vitamin B-12 (CYANOCOBALAMIN) 1000 MCG tablet Take 1,000 mcg by mouth Daily.     • lisinopril (PRINIVIL,ZESTRIL) 5 MG tablet Take 1 tablet by mouth Daily. 90 tablet 3     No current facility-administered medications for this visit.       Allergies  :  Methotrexate derivatives and Orencia [abatacept]       Past Medical History:   Diagnosis Date   • Coronary artery disease    • H/O hernia repair    • Heart murmur    • Hypercholesteremia 5/10/2017   • Rheumatoid arthritis (HCC)        Social History  "    Socioeconomic History   • Marital status:    Tobacco Use   • Smoking status: Former Smoker   • Smokeless tobacco: Never Used   • Tobacco comment: quit 35 years ago   Vaping Use   • Vaping Use: Never used   Substance and Sexual Activity   • Alcohol use: Not Currently   • Drug use: No   • Sexual activity: Defer       Family History   Problem Relation Age of Onset   • Hypertension Mother    • Diabetes Mother    • Cancer Father    • Hypertension Sister        Review of Systems   Constitutional: Positive for malaise/fatigue and weight loss. Negative for decreased appetite.   HENT: Negative for congestion and sore throat.    Eyes: Negative for blurred vision.   Cardiovascular: Positive for dyspnea on exertion. Negative for chest pain.   Respiratory: Negative for shortness of breath and snoring.    Endocrine: Negative for cold intolerance and heat intolerance.   Hematologic/Lymphatic: Negative for adenopathy. Does not bruise/bleed easily.   Skin: Negative for itching, nail changes and skin cancer.   Musculoskeletal: Negative for arthritis and myalgias.   Gastrointestinal: Negative for abdominal pain, dysphagia and heartburn.   Genitourinary: Negative for bladder incontinence and frequency.   Neurological: Positive for dizziness. Negative for light-headedness, seizures and vertigo.   Psychiatric/Behavioral: Negative for altered mental status.   Allergic/Immunologic: Negative for environmental allergies and hives.       Diabetes- No  Thyroid- normal    Objective     /68 (BP Location: Left arm)   Pulse 56   Ht 182.9 cm (72.01\")   Wt 90.4 kg (199 lb 3.2 oz)   BMI 27.01 kg/m²     Vitals and nursing note reviewed.   Constitutional:       Appearance: Healthy appearance. Not in distress.   Eyes:      Conjunctiva/sclera: Conjunctivae normal.      Pupils: Pupils are equal, round, and reactive to light.   HENT:      Head: Normocephalic.   Neck:      Vascular: Carotid bruit present.   Pulmonary:      Effort: " Pulmonary effort is normal.      Breath sounds: Normal breath sounds.   Cardiovascular:      PMI at left midclavicular line. Normal rate. Regular rhythm.      Murmurs: There is a grade 3/6 harsh midsystolic murmur at the URSB, radiating to the neck.   Abdominal:      General: Bowel sounds are normal.      Palpations: Abdomen is soft.   Musculoskeletal: Normal range of motion.      Cervical back: Normal range of motion and neck supple. Skin:     General: Skin is warm and dry.   Neurological:      Mental Status: Alert, oriented to person, place, and time and oriented to person, place and time.       Procedures            @ASSESSMENT/PLAN@  BMI is >= 25 and <30. (Overweight) The following options were offered after discussion;: nutrition counseling/recommendations     Diagnoses and all orders for this visit:    1. IHD (ischemic heart disease) (Primary)  -     nebivolol (BYSTOLIC) 5 MG tablet; Take 0.5 tablets by mouth Daily.  Dispense: 15 tablet; Refill: 11  -     amLODIPine (NORVASC) 5 MG tablet; Take 1 tablet by mouth Daily.  Dispense: 90 tablet; Refill: 3  -     prasugrel (EFFIENT) 10 MG tablet; Take 1 tablet by mouth Daily.  Dispense: 90 tablet; Refill: 3    2. Essential hypertension  -     lisinopril (PRINIVIL,ZESTRIL) 5 MG tablet; Take 1 tablet by mouth Daily.  Dispense: 90 tablet; Refill: 3  -     nebivolol (BYSTOLIC) 5 MG tablet; Take 0.5 tablets by mouth Daily.  Dispense: 15 tablet; Refill: 11  -     amLODIPine (NORVASC) 5 MG tablet; Take 1 tablet by mouth Daily.  Dispense: 90 tablet; Refill: 3    3. Hypercholesteremia  -     atorvastatin (LIPITOR) 20 MG tablet; Take 1 tablet by mouth Daily.  Dispense: 90 tablet; Refill: 3    4. Metabolic syndrome    5. Rheumatoid arthritis involving multiple sites with positive rheumatoid factor (HCC)    6. Shortness of breath    7. Left carotid bruit  -     US Carotid Bilateral; Future    8. Dizziness  -     US Carotid Bilateral; Future       At baseline his heart rate is  lower limit of normal.  His blood pressure is normal.  His BMI is around 27.  His clinical examination reveals carotid bruit on the left side.  He has a systolic murmur at the mitral area as well as at the aortic area.    Regarding his ischemic heart disease, he is not having any anginal symptoms and his stress test is borderline abnormal.  At this time we will continue Effient along with aspirin and also treat him with low-dose of beta-blockers and calcium channel blockers.  I did explain to him about the abnormal stress test.  If he develops any chest tightness, he is advised to call our office    Regarding his hypertension, it is running little hypotensive at this time.  I decreased the dose of lisinopril to 5 mg.  Continue the Bystolic and amlodipine.  He is advised to check with you about the renal function    Regarding his hypercholesterolemia, he is on Lipitor.  Continue the same.  I like to keep the LDL less than 70.  I would really appreciate, if you can send me copy of his labs.    Regarding his metabolic syndrome, he has lost some weight but he still little more than 25 BMI.  Advised him to continue the low-carb diet    Regarding his rheumatoid arthritis, he is still being followed at the rheumatologist office and he gets his Rituxan twice a year    Regarding the shortness of breath, it could be secondary to his COVID.  If the symptoms persist then he may need lab work to check his BNP level    Regarding the dizziness and the left carotid bruit, advised him to get a carotid ultrasound.    Based on the results, further recommendations will be made.                  Electronically signed by Millie Rueda MD July 26, 2022 15:45 EDT

## 2022-08-13 DIAGNOSIS — I25.9 IHD (ISCHEMIC HEART DISEASE): ICD-10-CM

## 2022-08-15 RX ORDER — ASPIRIN 81 MG/1
TABLET, COATED ORAL
Qty: 30 TABLET | Refills: 0 | Status: SHIPPED | OUTPATIENT
Start: 2022-08-15 | End: 2022-09-12

## 2022-09-12 DIAGNOSIS — I25.9 IHD (ISCHEMIC HEART DISEASE): ICD-10-CM

## 2022-09-12 RX ORDER — ASPIRIN 81 MG/1
TABLET, COATED ORAL
Qty: 30 TABLET | Refills: 0 | Status: SHIPPED | OUTPATIENT
Start: 2022-09-12 | End: 2022-10-10

## 2022-10-06 DIAGNOSIS — I25.9 IHD (ISCHEMIC HEART DISEASE): ICD-10-CM

## 2022-10-10 RX ORDER — ASPIRIN 81 MG/1
TABLET, COATED ORAL
Qty: 30 TABLET | Refills: 5 | Status: SHIPPED | OUTPATIENT
Start: 2022-10-10

## 2023-02-14 ENCOUNTER — OFFICE VISIT (OUTPATIENT)
Dept: CARDIOLOGY | Facility: CLINIC | Age: 63
End: 2023-02-14
Payer: COMMERCIAL

## 2023-02-14 VITALS
HEIGHT: 72 IN | WEIGHT: 207 LBS | BODY MASS INDEX: 28.04 KG/M2 | HEART RATE: 60 BPM | SYSTOLIC BLOOD PRESSURE: 126 MMHG | DIASTOLIC BLOOD PRESSURE: 84 MMHG

## 2023-02-14 DIAGNOSIS — I25.6 SILENT MYOCARDIAL ISCHEMIA: ICD-10-CM

## 2023-02-14 DIAGNOSIS — I35.0 AORTIC STENOSIS, MODERATE: ICD-10-CM

## 2023-02-14 DIAGNOSIS — I10 ESSENTIAL HYPERTENSION: ICD-10-CM

## 2023-02-14 DIAGNOSIS — E78.00 HYPERCHOLESTEREMIA: ICD-10-CM

## 2023-02-14 DIAGNOSIS — R06.02 SHORTNESS OF BREATH: ICD-10-CM

## 2023-02-14 DIAGNOSIS — M05.79 RHEUMATOID ARTHRITIS INVOLVING MULTIPLE SITES WITH POSITIVE RHEUMATOID FACTOR: ICD-10-CM

## 2023-02-14 DIAGNOSIS — I25.9 IHD (ISCHEMIC HEART DISEASE): Primary | ICD-10-CM

## 2023-02-14 PROCEDURE — 99214 OFFICE O/P EST MOD 30 MIN: CPT | Performed by: INTERNAL MEDICINE

## 2023-02-14 RX ORDER — AMLODIPINE BESYLATE 5 MG/1
5 TABLET ORAL DAILY
Qty: 90 TABLET | Refills: 3 | Status: SHIPPED | OUTPATIENT
Start: 2023-02-14

## 2023-02-14 RX ORDER — LISINOPRIL 5 MG/1
5 TABLET ORAL DAILY
Qty: 90 TABLET | Refills: 3 | Status: SHIPPED | OUTPATIENT
Start: 2023-02-14

## 2023-02-14 RX ORDER — PRASUGREL 10 MG/1
10 TABLET, FILM COATED ORAL DAILY
Qty: 90 TABLET | Refills: 3 | Status: SHIPPED | OUTPATIENT
Start: 2023-02-14

## 2023-02-14 RX ORDER — NEBIVOLOL 5 MG/1
2.5 TABLET ORAL DAILY
Qty: 15 TABLET | Refills: 11 | Status: SHIPPED | OUTPATIENT
Start: 2023-02-14

## 2023-02-14 RX ORDER — NITROGLYCERIN 0.4 MG/1
TABLET SUBLINGUAL
Qty: 25 TABLET | Refills: 10 | Status: SHIPPED | OUTPATIENT
Start: 2023-02-14

## 2023-02-14 RX ORDER — ATORVASTATIN CALCIUM 20 MG/1
20 TABLET, FILM COATED ORAL DAILY
Qty: 90 TABLET | Refills: 3 | Status: SHIPPED | OUTPATIENT
Start: 2023-02-14

## 2023-02-14 NOTE — PROGRESS NOTES
Chief Complaint   Patient presents with   • Follow-up     For cardiac management     • Med Refill     Refills needed on cardiac meds.90 days to [facility] in [place].    • Labs     Most recent labs from Jan in chart.    • Shortness of Breath     Reports being more SOB with exertion than he was.       CARDIAC COMPLAINTS  dyspnea and Cardiac Management        Subjective   Luis Miguel Haley is a 62 y.o. male came in today for his regular follow-up visit.  He has history of hypertension, hypercholesterolemia so has rheumatoid arthritis with extra-articular manifestation.  He was initially diagnosed with ischemic heart disease in 2017 when he had significant disease of the LAD and RCA and had multiple stenting placed.  Later he underwent stenting of the ramus.  His last cardiac work-up was in 2021 which showed a small anterior wall ischemia.  He came today stating that he has been noticing increasing shortness of breath for the last few months.  He is feeling tired and fatigued.  He still continues to have problem with rheumatoid arthritis and is getting Rituxan.  He did undergo lab work recently found to have a normal PSA, mildly elevated sed rate of 21.  His hemoglobin hematocrit was borderline low at 13.4 and 39.4.  Cholesterol is 123 with a LDL of 77.  Thyroid function test was normal.  His last carotid ultrasound still continues to show moderate disease of both the carotids              Cardiac History  Past Surgical History:   Procedure Laterality Date   • CARDIAC CATHETERIZATION  04/21/2017    85% LAD- 2.5x30,2.5x30,2.5x22 Resolute. 85% RCA- 3.5x38,3.5x18,2.25x12 Resolute. 75% Ramus.   • CARDIAC CATHETERIZATION  05/19/2017    90% Ramus- 2.25x12,2.25x16,2.25x8 Promus   • CARDIOVASCULAR STRESS TEST  04/19/2017    @[facility]. R.Stress- 7 Min, 62% THR. BP- 180/80. Borderline test   • CARDIOVASCULAR STRESS TEST  07/25/2017    L.Myoview- EF 50%. Inferior Infarct   • CARDIOVASCULAR STRESS TEST  10/17/2018    L.Cardiolite- EF  57%. Inferior Infarct Vs Diapharamatic attenuation.   • CARDIOVASCULAR STRESS TEST  12/15/2021    Lexiscan- EF 57%. Small Anterior Ischemia   • ECHO - CONVERTED  04/20/2017    @[facility]- EF 65%. AV nodular Thickenning   • ECHO - CONVERTED  10/17/2018    EF 55-60%   • ECHO - CONVERTED  12/15/2021    EF 60%. LA- 3.9 Cm. EDUARD- 1.8 Cm2. Mild MR. RVSP- 26 mmHg   • US CAROTID UNILATERAL  08/02/2022    @[facility]. 50-69% Both ICA       Current Outpatient Medications   Medication Sig Dispense Refill   • amLODIPine (NORVASC) 5 MG tablet Take 1 tablet by mouth Daily. 90 tablet 3   • Aspirin Low Dose 81 MG EC tablet TAKE ONE TABLET BY MOUTH DAILY 30 tablet 5   • atorvastatin (LIPITOR) 20 MG tablet Take 1 tablet by mouth Daily. 90 tablet 3   • Cholecalciferol (VITAMIN D3) 2000 units tablet Take 1 tablet by mouth Daily.     • lisinopril (PRINIVIL,ZESTRIL) 5 MG tablet Take 1 tablet by mouth Daily. 90 tablet 3   • nebivolol (BYSTOLIC) 5 MG tablet Take 0.5 tablets by mouth Daily. 15 tablet 11   • nitroglycerin (NITROSTAT) 0.4 MG SL tablet DISSOLVE 1 TAB UNDER TONGUE FOR CHEST PAIN - IF PAIN REMAINS AFTER 5 MIN, CALL 911 AND REPEAT DOSE. MAX 3 TABS IN 15 MINUTES 25 tablet 10   • pantoprazole (PROTONIX) 40 MG EC tablet Take 1 tablet by mouth Daily. 30 tablet 8   • prasugrel (EFFIENT) 10 MG tablet Take 1 tablet by mouth Daily. 90 tablet 3   • pregabalin (LYRICA) 300 MG capsule Take 300 mg by mouth 2 (Two) Times a Day.     • riTUXimab (RITUXAN) 100 MG/10ML solution injection intraveniously every 6 months     • vitamin B-12 (CYANOCOBALAMIN) 1000 MCG tablet Take 1,000 mcg by mouth Daily.       No current facility-administered medications for this visit.       Allergies  :  Methotrexate derivatives and Orencia [abatacept]       Past Medical History:   Diagnosis Date   • Coronary artery disease    • H/O hernia repair    • Heart murmur    • Hypercholesteremia 5/10/2017   • Rheumatoid arthritis (HCC)        Social History     Socioeconomic History   •  "Marital status:    Tobacco Use   • Smoking status: Former   • Smokeless tobacco: Never   • Tobacco comments:     quit 35 years ago   Vaping Use   • Vaping Use: Never used   Substance and Sexual Activity   • Alcohol use: Not Currently   • Drug use: No   • Sexual activity: Defer       Family History   Problem Relation Age of Onset   • Hypertension Mother    • Diabetes Mother    • Cancer Father    • Hypertension Sister        Review of Systems   Constitutional: Positive for malaise/fatigue. Negative for decreased appetite.   HENT: Negative for congestion and sore throat.    Eyes: Negative for blurred vision, double vision and visual disturbance.   Cardiovascular: Positive for dyspnea on exertion. Negative for chest pain.   Respiratory: Positive for shortness of breath. Negative for snoring.    Endocrine: Negative for cold intolerance and heat intolerance.   Hematologic/Lymphatic: Negative for adenopathy. Does not bruise/bleed easily.   Skin: Negative for itching, nail changes and skin cancer.   Musculoskeletal: Positive for arthritis and joint pain. Negative for myalgias.   Gastrointestinal: Negative for abdominal pain, dysphagia and heartburn.   Genitourinary: Negative for bladder incontinence and frequency.   Neurological: Negative for dizziness, seizures and vertigo.   Psychiatric/Behavioral: Negative for altered mental status.   Allergic/Immunologic: Negative for environmental allergies and hives.       Diabetes- No  Thyroid- normal    Objective     /84   Pulse 60   Ht 182.9 cm (72\")   Wt 93.9 kg (207 lb)   BMI 28.07 kg/m²     Vitals and nursing note reviewed.   Constitutional:       Appearance: Healthy appearance. Not in distress.   Eyes:      Conjunctiva/sclera: Conjunctivae normal.      Pupils: Pupils are equal, round, and reactive to light.   HENT:      Head: Normocephalic.   Pulmonary:      Effort: Pulmonary effort is normal.      Breath sounds: Normal breath sounds.   Cardiovascular:      PMI " at left midclavicular line. Normal rate. Regular rhythm.      Murmurs: There is a grade 3/6 harsh midsystolic murmur at the URSB, radiating to the neck.   Abdominal:      General: Bowel sounds are normal.      Palpations: Abdomen is soft.   Musculoskeletal: Normal range of motion.      Cervical back: Normal range of motion and neck supple. Skin:     General: Skin is warm and dry.   Neurological:      Mental Status: Alert, oriented to person, place, and time and oriented to person, place and time.       Procedures            @ASSESSMENT/PLAN@  BMI is >= 25 and <30. (Overweight) The following options were offered after discussion;: nutrition counseling/recommendations     Diagnoses and all orders for this visit:    1. IHD (ischemic heart disease) (Primary)  -     nebivolol (BYSTOLIC) 5 MG tablet; Take 0.5 tablets by mouth Daily.  Dispense: 15 tablet; Refill: 11  -     amLODIPine (NORVASC) 5 MG tablet; Take 1 tablet by mouth Daily.  Dispense: 90 tablet; Refill: 3  -     nitroglycerin (NITROSTAT) 0.4 MG SL tablet; DISSOLVE 1 TAB UNDER TONGUE FOR CHEST PAIN - IF PAIN REMAINS AFTER 5 MIN, CALL 911 AND REPEAT DOSE. MAX 3 TABS IN 15 MINUTES  Dispense: 25 tablet; Refill: 10  -     prasugrel (EFFIENT) 10 MG tablet; Take 1 tablet by mouth Daily.  Dispense: 90 tablet; Refill: 3  -     Stress Test With Myocardial Perfusion One Day; Future    2. Essential hypertension  -     lisinopril (PRINIVIL,ZESTRIL) 5 MG tablet; Take 1 tablet by mouth Daily.  Dispense: 90 tablet; Refill: 3  -     nebivolol (BYSTOLIC) 5 MG tablet; Take 0.5 tablets by mouth Daily.  Dispense: 15 tablet; Refill: 11  -     amLODIPine (NORVASC) 5 MG tablet; Take 1 tablet by mouth Daily.  Dispense: 90 tablet; Refill: 3    3. Hypercholesteremia  -     atorvastatin (LIPITOR) 20 MG tablet; Take 1 tablet by mouth Daily.  Dispense: 90 tablet; Refill: 3    4. Rheumatoid arthritis involving multiple sites with positive rheumatoid factor (HCC)    5. Shortness of breath  -      Adult Transthoracic Echo Complete W/ Cont if Necessary Per Protocol; Future    6. Silent myocardial ischemia  -     Stress Test With Myocardial Perfusion One Day; Future    7. Aortic stenosis, moderate       At baseline his heart rate is stable.  His blood pressure is normal.  His BMI is around 28.  His clinical examination reveals bilateral carotid bruit.  He also has a ejection systolic murmur at the aortic area.    Regarding his ischemic heart disease, he has undergone multiple stenting in the past.  He did have a mildly abnormal stress in the past.  He is now having more shortness of breath.  This could be an angina equivalent of silent ischemia.  I advised him to undergo a stress test in the form of Lexiscan.  Meanwhile continue Bystolic, amlodipine and Effient along with aspirin.  I also gave him prescription for nitroglycerin.  Though he has not taken 1 before for a long time the tablets he has since become like a powder    Regarding hypertension, it seems to be controlled with combination of lisinopril, Bystolic and amlodipine.  Continue the same    Regarding hypercholesterolemia, it is very well controlled with Lipitor.    Regarding rheumatoid arthritis he is followed by the rheumatologist    Regarding shortness of breath which could be caused by the aortic valvular stenosis, I like to repeat his echocardiogram to reevaluate his valvular structures as well as the LV function    Based on the results, further recommendations will be made.                Electronically signed by Millie Rueda MD February 14, 2023 14:37 EST

## 2023-02-23 ENCOUNTER — HOSPITAL ENCOUNTER (OUTPATIENT)
Dept: CARDIOLOGY | Facility: HOSPITAL | Age: 63
Discharge: HOME | End: 2023-02-23
Payer: COMMERCIAL

## 2023-02-23 VITALS — BODY MASS INDEX: 28.04 KG/M2 | HEIGHT: 72 IN | WEIGHT: 207.01 LBS

## 2023-02-23 DIAGNOSIS — R06.02 SHORTNESS OF BREATH: ICD-10-CM

## 2023-02-23 DIAGNOSIS — I25.9 IHD (ISCHEMIC HEART DISEASE): ICD-10-CM

## 2023-02-23 DIAGNOSIS — I25.6 SILENT MYOCARDIAL ISCHEMIA: ICD-10-CM

## 2023-02-23 LAB
AORTIC DIMENSIONLESS INDEX: 0.47 (DI)
BH CV ECHO MEAS - ACS: 0.7 CM
BH CV ECHO MEAS - AO MAX PG: 27.4 MMHG
BH CV ECHO MEAS - AO MEAN PG: 13 MMHG
BH CV ECHO MEAS - AO ROOT DIAM: 2.8 CM
BH CV ECHO MEAS - AO V2 MAX: 261.8 CM/SEC
BH CV ECHO MEAS - AO V2 VTI: 64.2 CM
BH CV ECHO MEAS - AVA(I,D): 1.62 CM2
BH CV ECHO MEAS - EDV(CUBED): 86.9 ML
BH CV ECHO MEAS - EF_3D-VOL: 52 %
BH CV ECHO MEAS - ESV(CUBED): 28.8 ML
BH CV ECHO MEAS - FS: 30.8 %
BH CV ECHO MEAS - IVS/LVPW: 1.24 CM
BH CV ECHO MEAS - IVSD: 1.16 CM
BH CV ECHO MEAS - LA DIMENSION: 3.9 CM
BH CV ECHO MEAS - LAT PEAK E' VEL: 12.3 CM/SEC
BH CV ECHO MEAS - LV MASS(C)D: 160.1 GRAMS
BH CV ECHO MEAS - LV MAX PG: 6 MMHG
BH CV ECHO MEAS - LV MEAN PG: 2.7 MMHG
BH CV ECHO MEAS - LV V1 MAX: 122.6 CM/SEC
BH CV ECHO MEAS - LV V1 VTI: 32.9 CM
BH CV ECHO MEAS - LVIDD: 4.4 CM
BH CV ECHO MEAS - LVIDS: 3.1 CM
BH CV ECHO MEAS - LVOT AREA: 3.2 CM2
BH CV ECHO MEAS - LVOT DIAM: 2.01 CM
BH CV ECHO MEAS - LVPWD: 0.94 CM
BH CV ECHO MEAS - MED PEAK E' VEL: 7.8 CM/SEC
BH CV ECHO MEAS - MV A MAX VEL: 67.7 CM/SEC
BH CV ECHO MEAS - MV DEC SLOPE: 492.6 CM/SEC2
BH CV ECHO MEAS - MV DEC TIME: 0.24 MSEC
BH CV ECHO MEAS - MV E MAX VEL: 98.8 CM/SEC
BH CV ECHO MEAS - MV E/A: 1.46
BH CV ECHO MEAS - MV MAX PG: 5 MMHG
BH CV ECHO MEAS - MV MEAN PG: 1.64 MMHG
BH CV ECHO MEAS - MV P1/2T: 64.3 MSEC
BH CV ECHO MEAS - MV V2 VTI: 46.8 CM
BH CV ECHO MEAS - MVA(P1/2T): 3.4 CM2
BH CV ECHO MEAS - MVA(VTI): 2.23 CM2
BH CV ECHO MEAS - PA V2 MAX: 116.9 CM/SEC
BH CV ECHO MEAS - RAP SYSTOLE: 8 MMHG
BH CV ECHO MEAS - RV MAX PG: 1.25 MMHG
BH CV ECHO MEAS - RV V1 MAX: 55.8 CM/SEC
BH CV ECHO MEAS - RV V1 VTI: 16 CM
BH CV ECHO MEAS - RVDD: 2.9 CM
BH CV ECHO MEAS - RVSP: 19.2 MMHG
BH CV ECHO MEAS - SV(LVOT): 104.1 ML
BH CV ECHO MEAS - TAPSE (>1.6): 2.8 CM
BH CV ECHO MEAS - TR MAX PG: 11.2 MMHG
BH CV ECHO MEAS - TR MAX VEL: 167.1 CM/SEC
BH CV ECHO MEASUREMENTS AVERAGE E/E' RATIO: 9.83
BH CV REST NUCLEAR ISOTOPE DOSE: 10 MCI
BH CV STRESS COMMENTS STAGE 1: NORMAL
BH CV STRESS DOSE REGADENOSON STAGE 1: 0.4
BH CV STRESS DURATION MIN STAGE 1: 0
BH CV STRESS DURATION SEC STAGE 1: 10
BH CV STRESS NUCLEAR ISOTOPE DOSE: 30 MCI
BH CV STRESS PROTOCOL 1: NORMAL
BH CV STRESS RECOVERY BP: NORMAL MMHG
BH CV STRESS RECOVERY HR: 71 BPM
BH CV STRESS STAGE 1: 1
BH CV XLRA - TDI S': 9.9 CM/SEC
LV EF NUC BP: 57 %
MAXIMAL PREDICTED HEART RATE: 159 BPM
MAXIMAL PREDICTED HEART RATE: 159 BPM
PERCENT MAX PREDICTED HR: 51.57 %
SINUS: 2.6 CM
STRESS BASELINE BP: NORMAL MMHG
STRESS BASELINE HR: 47 BPM
STRESS PERCENT HR: 61 %
STRESS POST PEAK BP: NORMAL MMHG
STRESS POST PEAK HR: 82 BPM
STRESS TARGET HR: 135 BPM
STRESS TARGET HR: 135 BPM

## 2023-02-23 PROCEDURE — 0 TECHNETIUM SESTAMIBI: Performed by: INTERNAL MEDICINE

## 2023-02-23 PROCEDURE — A9500 TC99M SESTAMIBI: HCPCS | Performed by: INTERNAL MEDICINE

## 2023-02-23 PROCEDURE — 78452 HT MUSCLE IMAGE SPECT MULT: CPT

## 2023-02-23 PROCEDURE — 93018 CV STRESS TEST I&R ONLY: CPT | Performed by: INTERNAL MEDICINE

## 2023-02-23 PROCEDURE — 78452 HT MUSCLE IMAGE SPECT MULT: CPT | Performed by: INTERNAL MEDICINE

## 2023-02-23 PROCEDURE — 93306 TTE W/DOPPLER COMPLETE: CPT

## 2023-02-23 PROCEDURE — 93017 CV STRESS TEST TRACING ONLY: CPT

## 2023-02-23 PROCEDURE — 25010000002 REGADENOSON 0.4 MG/5ML SOLUTION: Performed by: INTERNAL MEDICINE

## 2023-02-23 PROCEDURE — 93306 TTE W/DOPPLER COMPLETE: CPT | Performed by: INTERNAL MEDICINE

## 2023-02-23 RX ADMIN — TECHNETIUM TC 99M SESTAMIBI 1 DOSE: 1 INJECTION INTRAVENOUS at 08:27

## 2023-02-23 RX ADMIN — TECHNETIUM TC 99M SESTAMIBI 1 DOSE: 1 INJECTION INTRAVENOUS at 10:29

## 2023-02-23 RX ADMIN — REGADENOSON 0.4 MG: 0.08 INJECTION, SOLUTION INTRAVENOUS at 10:29

## 2023-04-14 DIAGNOSIS — I25.9 IHD (ISCHEMIC HEART DISEASE): ICD-10-CM

## 2023-04-17 RX ORDER — ASPIRIN 81 MG/1
TABLET, COATED ORAL
Qty: 30 TABLET | Refills: 5 | Status: SHIPPED | OUTPATIENT
Start: 2023-04-17

## 2023-08-31 ENCOUNTER — OFFICE VISIT (OUTPATIENT)
Dept: CARDIOLOGY | Facility: CLINIC | Age: 63
End: 2023-08-31
Payer: COMMERCIAL

## 2023-08-31 VITALS
HEIGHT: 71 IN | HEART RATE: 64 BPM | WEIGHT: 204.6 LBS | BODY MASS INDEX: 28.64 KG/M2 | DIASTOLIC BLOOD PRESSURE: 76 MMHG | SYSTOLIC BLOOD PRESSURE: 124 MMHG

## 2023-08-31 DIAGNOSIS — I25.9 IHD (ISCHEMIC HEART DISEASE): Primary | ICD-10-CM

## 2023-08-31 DIAGNOSIS — I35.0 AORTIC STENOSIS, MODERATE: ICD-10-CM

## 2023-08-31 DIAGNOSIS — M05.79 RHEUMATOID ARTHRITIS INVOLVING MULTIPLE SITES WITH POSITIVE RHEUMATOID FACTOR: ICD-10-CM

## 2023-08-31 DIAGNOSIS — I10 ESSENTIAL HYPERTENSION: ICD-10-CM

## 2023-08-31 DIAGNOSIS — E78.00 HYPERCHOLESTEREMIA: ICD-10-CM

## 2023-08-31 PROCEDURE — 99214 OFFICE O/P EST MOD 30 MIN: CPT | Performed by: INTERNAL MEDICINE

## 2023-08-31 RX ORDER — PRASUGREL 10 MG/1
10 TABLET, FILM COATED ORAL DAILY
Qty: 90 TABLET | Refills: 3 | Status: SHIPPED | OUTPATIENT
Start: 2023-08-31

## 2023-08-31 RX ORDER — LISINOPRIL 5 MG/1
5 TABLET ORAL DAILY
Qty: 90 TABLET | Refills: 3 | Status: SHIPPED | OUTPATIENT
Start: 2023-08-31

## 2023-08-31 RX ORDER — GABAPENTIN 800 MG/1
800 TABLET ORAL 4 TIMES DAILY
COMMUNITY

## 2023-08-31 RX ORDER — NEBIVOLOL 5 MG/1
2.5 TABLET ORAL DAILY
Qty: 15 TABLET | Refills: 11 | Status: SHIPPED | OUTPATIENT
Start: 2023-08-31

## 2023-08-31 RX ORDER — ASPIRIN 81 MG/1
81 TABLET ORAL DAILY
Qty: 90 TABLET | Refills: 3 | Status: SHIPPED | OUTPATIENT
Start: 2023-08-31

## 2023-08-31 RX ORDER — AMLODIPINE BESYLATE 5 MG/1
5 TABLET ORAL DAILY
Qty: 90 TABLET | Refills: 3 | Status: SHIPPED | OUTPATIENT
Start: 2023-08-31

## 2023-08-31 RX ORDER — GOLIMUMAB 50 MG/4ML
SOLUTION INTRAVENOUS
COMMUNITY

## 2023-08-31 RX ORDER — ATORVASTATIN CALCIUM 20 MG/1
20 TABLET, FILM COATED ORAL DAILY
Qty: 90 TABLET | Refills: 3 | Status: SHIPPED | OUTPATIENT
Start: 2023-08-31

## 2023-08-31 NOTE — PROGRESS NOTES
Chief Complaint   Patient presents with    Follow-up     Pt is here for cardiac follow up.  Pt denies CP, SOB, dizziness or palpitations.  Pt does take a daily ASA.    Med Refill     Pt request 90 day refills to be sent to [facility] in [place].  Medications were verified by med list.      Lab Work     Pt states their last labs were about 3 weeks ago with his PCP.         CARDIAC COMPLAINTS  Cardiac management        Subjective   Luis Miguel Haley is a 63 y.o. male came in today for his regular follow-up visit.  He has history of ischemic heart disease diagnosed in 2017.  He also has significant rheumatoid arthritis he was found to have significant calcification involving multiple vessels.  He underwent stenting of the LAD and RCA in April and stenting of the ramus in May 2017.  Since then he has been managed medically.  His last cardiac work-up which was done in February of this year showed normal LV systolic function small apical ischemia almost similar to in 2021.  His echocardiogram showed moderate aortic valvular stenosis and being managed medically.  His last lab work which was done in January of this year showed sed rate has come down to 21 his hemoglobin and hematocrit was lower limit of normal his cholesterol is 123 with the LDL of 77.  Apparently he had his lipids checked again in May and was told it was elevated.  His Lipitor dose has been increased.  He still continues to have problem with rheumatoid arthritis.  Rituxan was stopped and now he is on Golimumab.  So far he has received 2 doses but still having a lot of joint pain.  He is able to do most of his activities without any problem.              Cardiac History  Past Surgical History:   Procedure Laterality Date    CARDIAC CATHETERIZATION  04/21/2017    85% LAD- 2.5x30,2.5x30,2.5x22 Resolute. 85% RCA- 3.5x38,3.5x18,2.25x12 Resolute. 75% Ramus.    CARDIAC CATHETERIZATION  05/19/2017    90% Ramus- 2.25x12,2.25x16,2.25x8 Promus    CARDIOVASCULAR  STRESS TEST  04/19/2017    @[facility]. R.Stress- 7 Min, 62% THR. BP- 180/80. Borderline test    CARDIOVASCULAR STRESS TEST  07/25/2017    L.Myoview- EF 50%. Inferior Infarct    CARDIOVASCULAR STRESS TEST  10/17/2018    L.Cardiolite- EF 57%. Inferior Infarct Vs Diapharamatic attenuation.    CARDIOVASCULAR STRESS TEST  12/15/2021    Lexiscan- EF 57%. Small Anterior Ischemia    CARDIOVASCULAR STRESS TEST  02/23/2023    Lexiscan- EF 57%. Small Apical Ischemia    ECHO - CONVERTED  04/20/2017    @[facility]- EF 65%. AV nodular Thickenning    ECHO - CONVERTED  10/17/2018    EF 55-60%    ECHO - CONVERTED  12/15/2021    EF 60%. LA- 3.9 Cm. EDUARD- 1.8 Cm2. Mild MR. RVSP- 26 mmHg    ECHO - CONVERTED  02/23/2023    EF 60%. LA-3.9. EDUARD- 1.6.13/27 mmHg. RVSP- 19 mmHg    US CAROTID UNILATERAL  08/02/2022    @[facility]. 50-69% Both ICA       Current Outpatient Medications   Medication Sig Dispense Refill    amLODIPine (NORVASC) 5 MG tablet Take 1 tablet by mouth Daily. 90 tablet 3    aspirin (Aspirin Low Dose) 81 MG EC tablet Take 1 tablet by mouth Daily. 90 tablet 3    atorvastatin (LIPITOR) 20 MG tablet Take 1 tablet by mouth Daily. 90 tablet 3    Cholecalciferol (VITAMIN D3) 2000 units tablet Take 1 tablet by mouth Daily.      gabapentin (NEURONTIN) 800 MG tablet Take 1 tablet by mouth 4 (Four) Times a Day.      golimumab (Simponi Aria) 50 MG/4ML solution injection Infuse  into a venous catheter Every 6 (Six) Weeks.      lisinopril (PRINIVIL,ZESTRIL) 5 MG tablet Take 1 tablet by mouth Daily. 90 tablet 3    nebivolol (BYSTOLIC) 5 MG tablet Take 0.5 tablets by mouth Daily. 15 tablet 11    nitroglycerin (NITROSTAT) 0.4 MG SL tablet DISSOLVE 1 TAB UNDER TONGUE FOR CHEST PAIN - IF PAIN REMAINS AFTER 5 MIN, CALL 911 AND REPEAT DOSE. MAX 3 TABS IN 15 MINUTES 25 tablet 10    pantoprazole (PROTONIX) 40 MG EC tablet Take 1 tablet by mouth Daily. 30 tablet 8    prasugrel (EFFIENT) 10 MG tablet Take 1 tablet by mouth Daily. 90 tablet 3     vitamin B-12 (CYANOCOBALAMIN) 1000 MCG tablet Take 1 tablet by mouth Daily.      pregabalin (LYRICA) 300 MG capsule Take 1 capsule by mouth 2 (Two) Times a Day.      riTUXimab (RITUXAN) 100 MG/10ML solution injection intraveniously every 6 months (Patient not taking: Reported on 8/31/2023)       No current facility-administered medications for this visit.       Allergies  :  Methotrexate derivatives and Orencia [abatacept]       Past Medical History:   Diagnosis Date    Coronary artery disease     H/O hernia repair     Heart murmur     Hypercholesteremia 5/10/2017    Rheumatoid arthritis        Social History     Socioeconomic History    Marital status:    Tobacco Use    Smoking status: Former    Smokeless tobacco: Never    Tobacco comments:     quit 35 years ago   Vaping Use    Vaping Use: Never used   Substance and Sexual Activity    Alcohol use: Not Currently    Drug use: No    Sexual activity: Defer       Family History   Problem Relation Age of Onset    Hypertension Mother     Diabetes Mother     Cancer Father     Hypertension Sister        Review of Systems   Constitutional: Negative for decreased appetite and malaise/fatigue.   HENT:  Negative for congestion and sore throat.    Eyes:  Negative for blurred vision, double vision and visual disturbance.   Cardiovascular:  Negative for chest pain and palpitations.   Respiratory:  Negative for shortness of breath and snoring.    Endocrine: Negative for cold intolerance and heat intolerance.   Hematologic/Lymphatic: Negative for adenopathy. Does not bruise/bleed easily.   Skin:  Negative for itching, nail changes and skin cancer.   Musculoskeletal:  Positive for arthritis and joint pain. Negative for myalgias.   Gastrointestinal:  Negative for abdominal pain, dysphagia and heartburn.   Genitourinary:  Negative for bladder incontinence and frequency.   Neurological:  Negative for dizziness, seizures and vertigo.   Psychiatric/Behavioral:   "Negative for altered mental status.    Allergic/Immunologic: Negative for environmental allergies and hives.     Diabetes- No  Thyroid- normal    Objective     /76 (BP Location: Left arm, Patient Position: Sitting)   Pulse 64   Ht 180.3 cm (71\")   Wt 92.8 kg (204 lb 9.6 oz)   BMI 28.54 kg/mý     Vitals and nursing note reviewed.   Constitutional:       Appearance: Healthy appearance. Not in distress.   Eyes:      Conjunctiva/sclera: Conjunctivae normal.      Pupils: Pupils are equal, round, and reactive to light.   HENT:      Head: Normocephalic.   Pulmonary:      Effort: Pulmonary effort is normal.      Breath sounds: Normal breath sounds.   Cardiovascular:      PMI at left midclavicular line. Normal rate. Regular rhythm.      Murmurs: There is a grade 4/6 harsh midsystolic murmur at the URSB, radiating to the neck.   Abdominal:      General: Bowel sounds are normal.      Palpations: Abdomen is soft.   Musculoskeletal: Normal range of motion.      Cervical back: Normal range of motion and neck supple. Skin:     General: Skin is warm and dry.   Neurological:      Mental Status: Alert, oriented to person, place, and time and oriented to person, place and time.   Procedures            @ASSESSMENT/PLAN@        Diagnoses and all orders for this visit:    1. IHD (ischemic heart disease) (Primary)  -     nebivolol (BYSTOLIC) 5 MG tablet; Take 0.5 tablets by mouth Daily.  Dispense: 15 tablet; Refill: 11  -     amLODIPine (NORVASC) 5 MG tablet; Take 1 tablet by mouth Daily.  Dispense: 90 tablet; Refill: 3  -     prasugrel (EFFIENT) 10 MG tablet; Take 1 tablet by mouth Daily.  Dispense: 90 tablet; Refill: 3  -     aspirin (Aspirin Low Dose) 81 MG EC tablet; Take 1 tablet by mouth Daily.  Dispense: 90 tablet; Refill: 3  -     CBC & Differential; Future    2. Hypercholesteremia  -     atorvastatin (LIPITOR) 20 MG tablet; Take 1 tablet by mouth Daily.  Dispense: 90 tablet; Refill: 3  -     Lipid Panel; Future    3. " Essential hypertension  -     lisinopril (PRINIVIL,ZESTRIL) 5 MG tablet; Take 1 tablet by mouth Daily.  Dispense: 90 tablet; Refill: 3  -     nebivolol (BYSTOLIC) 5 MG tablet; Take 0.5 tablets by mouth Daily.  Dispense: 15 tablet; Refill: 11  -     amLODIPine (NORVASC) 5 MG tablet; Take 1 tablet by mouth Daily.  Dispense: 90 tablet; Refill: 3  -     Comprehensive Metabolic Panel; Future    4. Aortic stenosis, moderate    5. Rheumatoid arthritis involving multiple sites with positive rheumatoid factor  -     Sedimentation Rate; Future       At baseline his heart rate is stable.  His blood pressure is normal.  His BMI still remains around 29.  His cardiovascular examination is otherwise unremarkable    Regarding his ischemic heart disease he has undergone multiple stenting of the LAD RCA and the ramus.  His last stress test still continues to show small apical ischemia.  He is completely asymptomatic at this time.  We will continue on aspirin and Effient.  He need his blood count rechecked during his blood draw    Regarding his hypercholesterolemia, he is now on moderate dose of Lipitor.  Need to recheck his lipid profile along with the LFT    Regarding his hypertension, it seems to be very well controlled with lisinopril, Bystolic and Norvasc.  He need his electrolytes checked along with renal function    Regarding his sciatica valvular stenosis which is still around moderate, he is not having any symptoms.  At this time continue to monitor.  He may need a repeat echocardiogram in the next 1 to 2 years or earlier if he develops symptoms of dizziness, chest pain or shortness of breath    Regarding his rheumatoid arthritis, he has been followed by the rheumatologist.  Continue the same and recheck his sed rate    Overall cardiac status appears stable.  I will see him back in 6 months or sooner if needed                  Electronically signed by Millie Rueda MD August 31, 2023 13:11 EDT

## 2023-08-31 NOTE — LETTER
August 31, 2023       No Recipients    Patient: Luis Miguel Haley   YOB: 1960   Date of Visit: 8/31/2023     Dear NORMA Perez:       Thank you for referring Luis Miguel Haley to me for evaluation. Below are the relevant portions of my assessment and plan of care.    If you have questions, please do not hesitate to call me. I look forward to following Luis Miguel along with you.         Sincerely,        Millie Rueda MD        CC:   No Recipients    Millie Rueda MD  08/31/23 1317  Sign when Signing Visit  Chief Complaint   Patient presents with    Follow-up     Pt is here for cardiac follow up.  Pt denies CP, SOB, dizziness or palpitations.  Pt does take a daily ASA.    Med Refill     Pt request 90 day refills to be sent to [facility] in [place].  Medications were verified by med list.      Lab Work     Pt states their last labs were about 3 weeks ago with his PCP.         CARDIAC COMPLAINTS  Cardiac management        Subjective  Luis Miguel Haley is a 63 y.o. male came in today for his regular follow-up visit.  He has history of ischemic heart disease diagnosed in 2017.  He also has significant rheumatoid arthritis he was found to have significant calcification involving multiple vessels.  He underwent stenting of the LAD and RCA in April and stenting of the ramus in May 2017.  Since then he has been managed medically.  His last cardiac work-up which was done in February of this year showed normal LV systolic function small apical ischemia almost similar to in 2021.  His echocardiogram showed moderate aortic valvular stenosis and being managed medically.  His last lab work which was done in January of this year showed sed rate has come down to 21 his hemoglobin and hematocrit was lower limit of normal his cholesterol is 123 with the LDL of 77.  Apparently he had his lipids checked again in May and was told it was elevated.  His Lipitor dose has been increased.  He still continues to have  problem with rheumatoid arthritis.  Rituxan was stopped and now he is on Golimumab.  So far he has received 2 doses but still having a lot of joint pain.  He is able to do most of his activities without any problem.              Cardiac History  Past Surgical History:   Procedure Laterality Date    CARDIAC CATHETERIZATION  04/21/2017    85% LAD- 2.5x30,2.5x30,2.5x22 Resolute. 85% RCA- 3.5x38,3.5x18,2.25x12 Resolute. 75% Ramus.    CARDIAC CATHETERIZATION  05/19/2017    90% Ramus- 2.25x12,2.25x16,2.25x8 Promus    CARDIOVASCULAR STRESS TEST  04/19/2017    @[facility]. R.Stress- 7 Min, 62% THR. BP- 180/80. Borderline test    CARDIOVASCULAR STRESS TEST  07/25/2017    L.Myoview- EF 50%. Inferior Infarct    CARDIOVASCULAR STRESS TEST  10/17/2018    L.Cardiolite- EF 57%. Inferior Infarct Vs Diapharamatic attenuation.    CARDIOVASCULAR STRESS TEST  12/15/2021    Lexiscan- EF 57%. Small Anterior Ischemia    CARDIOVASCULAR STRESS TEST  02/23/2023    Lexiscan- EF 57%. Small Apical Ischemia    ECHO - CONVERTED  04/20/2017    @[facility]- EF 65%. AV nodular Thickenning    ECHO - CONVERTED  10/17/2018    EF 55-60%    ECHO - CONVERTED  12/15/2021    EF 60%. LA- 3.9 Cm. EDUARD- 1.8 Cm2. Mild MR. RVSP- 26 mmHg    ECHO - CONVERTED  02/23/2023    EF 60%. LA-3.9. EDUARD- 1.6.13/27 mmHg. RVSP- 19 mmHg    US CAROTID UNILATERAL  08/02/2022    @[facility]. 50-69% Both ICA       Current Outpatient Medications   Medication Sig Dispense Refill    amLODIPine (NORVASC) 5 MG tablet Take 1 tablet by mouth Daily. 90 tablet 3    aspirin (Aspirin Low Dose) 81 MG EC tablet Take 1 tablet by mouth Daily. 90 tablet 3    atorvastatin (LIPITOR) 20 MG tablet Take 1 tablet by mouth Daily. 90 tablet 3    Cholecalciferol (VITAMIN D3) 2000 units tablet Take 1 tablet by mouth Daily.      gabapentin (NEURONTIN) 800 MG tablet Take 1 tablet by mouth 4 (Four) Times a Day.      golimumab (Simponi Aria) 50 MG/4ML solution injection Infuse  into a venous catheter Every 6 (Six)  Weeks.      lisinopril (PRINIVIL,ZESTRIL) 5 MG tablet Take 1 tablet by mouth Daily. 90 tablet 3    nebivolol (BYSTOLIC) 5 MG tablet Take 0.5 tablets by mouth Daily. 15 tablet 11    nitroglycerin (NITROSTAT) 0.4 MG SL tablet DISSOLVE 1 TAB UNDER TONGUE FOR CHEST PAIN - IF PAIN REMAINS AFTER 5 MIN, CALL 911 AND REPEAT DOSE. MAX 3 TABS IN 15 MINUTES 25 tablet 10    pantoprazole (PROTONIX) 40 MG EC tablet Take 1 tablet by mouth Daily. 30 tablet 8    prasugrel (EFFIENT) 10 MG tablet Take 1 tablet by mouth Daily. 90 tablet 3    vitamin B-12 (CYANOCOBALAMIN) 1000 MCG tablet Take 1 tablet by mouth Daily.      pregabalin (LYRICA) 300 MG capsule Take 1 capsule by mouth 2 (Two) Times a Day.      riTUXimab (RITUXAN) 100 MG/10ML solution injection intraveniously every 6 months (Patient not taking: Reported on 8/31/2023)       No current facility-administered medications for this visit.       Allergies  :  Methotrexate derivatives and Orencia [abatacept]       Past Medical History:   Diagnosis Date    Coronary artery disease     H/O hernia repair     Heart murmur     Hypercholesteremia 5/10/2017    Rheumatoid arthritis        Social History     Socioeconomic History    Marital status:    Tobacco Use    Smoking status: Former    Smokeless tobacco: Never    Tobacco comments:     quit 35 years ago   Vaping Use    Vaping Use: Never used   Substance and Sexual Activity    Alcohol use: Not Currently    Drug use: No    Sexual activity: Defer       Family History   Problem Relation Age of Onset    Hypertension Mother     Diabetes Mother     Cancer Father     Hypertension Sister        Review of Systems   Constitutional: Negative for decreased appetite and malaise/fatigue.   HENT:  Negative for congestion and sore throat.    Eyes:  Negative for blurred vision, double vision and visual disturbance.   Cardiovascular:  Negative for chest pain and palpitations.   Respiratory:  Negative for shortness of breath  "and snoring.    Endocrine: Negative for cold intolerance and heat intolerance.   Hematologic/Lymphatic: Negative for adenopathy. Does not bruise/bleed easily.   Skin:  Negative for itching, nail changes and skin cancer.   Musculoskeletal:  Positive for arthritis and joint pain. Negative for myalgias.   Gastrointestinal:  Negative for abdominal pain, dysphagia and heartburn.   Genitourinary:  Negative for bladder incontinence and frequency.   Neurological:  Negative for dizziness, seizures and vertigo.   Psychiatric/Behavioral:  Negative for altered mental status.    Allergic/Immunologic: Negative for environmental allergies and hives.     Diabetes- No  Thyroid- normal    Objective    /76 (BP Location: Left arm, Patient Position: Sitting)   Pulse 64   Ht 180.3 cm (71\")   Wt 92.8 kg (204 lb 9.6 oz)   BMI 28.54 kg/mý     Vitals and nursing note reviewed.   Constitutional:       Appearance: Healthy appearance. Not in distress.   Eyes:      Conjunctiva/sclera: Conjunctivae normal.      Pupils: Pupils are equal, round, and reactive to light.   HENT:      Head: Normocephalic.   Pulmonary:      Effort: Pulmonary effort is normal.      Breath sounds: Normal breath sounds.   Cardiovascular:      PMI at left midclavicular line. Normal rate. Regular rhythm.      Murmurs: There is a grade 4/6 harsh midsystolic murmur at the URSB, radiating to the neck.   Abdominal:      General: Bowel sounds are normal.      Palpations: Abdomen is soft.   Musculoskeletal: Normal range of motion.      Cervical back: Normal range of motion and neck supple. Skin:     General: Skin is warm and dry.   Neurological:      Mental Status: Alert, oriented to person, place, and time and oriented to person, place and time.   Procedures            @ASSESSMENT/PLAN@        Diagnoses and all orders for this visit:    1. IHD (ischemic heart disease) (Primary)  -     nebivolol (BYSTOLIC) 5 MG tablet; Take 0.5 tablets by mouth Daily.  Dispense: 15 " tablet; Refill: 11  -     amLODIPine (NORVASC) 5 MG tablet; Take 1 tablet by mouth Daily.  Dispense: 90 tablet; Refill: 3  -     prasugrel (EFFIENT) 10 MG tablet; Take 1 tablet by mouth Daily.  Dispense: 90 tablet; Refill: 3  -     aspirin (Aspirin Low Dose) 81 MG EC tablet; Take 1 tablet by mouth Daily.  Dispense: 90 tablet; Refill: 3  -     CBC & Differential; Future    2. Hypercholesteremia  -     atorvastatin (LIPITOR) 20 MG tablet; Take 1 tablet by mouth Daily.  Dispense: 90 tablet; Refill: 3  -     Lipid Panel; Future    3. Essential hypertension  -     lisinopril (PRINIVIL,ZESTRIL) 5 MG tablet; Take 1 tablet by mouth Daily.  Dispense: 90 tablet; Refill: 3  -     nebivolol (BYSTOLIC) 5 MG tablet; Take 0.5 tablets by mouth Daily.  Dispense: 15 tablet; Refill: 11  -     amLODIPine (NORVASC) 5 MG tablet; Take 1 tablet by mouth Daily.  Dispense: 90 tablet; Refill: 3  -     Comprehensive Metabolic Panel; Future    4. Aortic stenosis, moderate    5. Rheumatoid arthritis involving multiple sites with positive rheumatoid factor  -     Sedimentation Rate; Future       At baseline his heart rate is stable.  His blood pressure is normal.  His BMI still remains around 29.  His cardiovascular examination is otherwise unremarkable    Regarding his ischemic heart disease he has undergone multiple stenting of the LAD RCA and the ramus.  His last stress test still continues to show small apical ischemia.  He is completely asymptomatic at this time.  We will continue on aspirin and Effient.  He need his blood count rechecked during his blood draw    Regarding his hypercholesterolemia, he is now on moderate dose of Lipitor.  Need to recheck his lipid profile along with the LFT    Regarding his hypertension, it seems to be very well controlled with lisinopril, Bystolic and Norvasc.  He need his electrolytes checked along with renal function    Regarding his sciatica valvular stenosis which is still around moderate, he is not  having any symptoms.  At this time continue to monitor.  He may need a repeat echocardiogram in the next 1 to 2 years or earlier if he develops symptoms of dizziness, chest pain or shortness of breath    Regarding his rheumatoid arthritis, he has been followed by the rheumatologist.  Continue the same and recheck his sed rate    Overall cardiac status appears stable.  I will see him back in 6 months or sooner if needed                  Electronically signed by Millie Rueda MD August 31, 2023 13:11 EDT

## 2024-03-05 ENCOUNTER — OFFICE VISIT (OUTPATIENT)
Dept: CARDIOLOGY | Facility: CLINIC | Age: 64
End: 2024-03-05
Payer: COMMERCIAL

## 2024-03-05 VITALS
HEIGHT: 71 IN | DIASTOLIC BLOOD PRESSURE: 80 MMHG | HEART RATE: 52 BPM | WEIGHT: 208.8 LBS | OXYGEN SATURATION: 97 % | SYSTOLIC BLOOD PRESSURE: 130 MMHG | BODY MASS INDEX: 29.23 KG/M2

## 2024-03-05 DIAGNOSIS — I35.0 AORTIC STENOSIS, MODERATE: ICD-10-CM

## 2024-03-05 DIAGNOSIS — R06.02 SHORTNESS OF BREATH: ICD-10-CM

## 2024-03-05 DIAGNOSIS — I25.9 IHD (ISCHEMIC HEART DISEASE): Primary | ICD-10-CM

## 2024-03-05 DIAGNOSIS — E78.00 HYPERCHOLESTEREMIA: ICD-10-CM

## 2024-03-05 DIAGNOSIS — I10 ESSENTIAL HYPERTENSION: ICD-10-CM

## 2024-03-05 DIAGNOSIS — M05.79 RHEUMATOID ARTHRITIS INVOLVING MULTIPLE SITES WITH POSITIVE RHEUMATOID FACTOR: ICD-10-CM

## 2024-03-05 PROCEDURE — 99214 OFFICE O/P EST MOD 30 MIN: CPT | Performed by: INTERNAL MEDICINE

## 2024-03-05 RX ORDER — METHOTREXATE 25 MG/ML
0.4 INJECTION INTRA-ARTERIAL; INTRAMUSCULAR; INTRATHECAL; INTRAVENOUS ONCE
COMMUNITY

## 2024-03-05 RX ORDER — LEFLUNOMIDE 20 MG/1
20 TABLET ORAL DAILY
COMMUNITY
End: 2024-03-05

## 2024-03-05 RX ORDER — FOLIC ACID 1 MG/1
1 TABLET ORAL DAILY
COMMUNITY

## 2024-03-05 NOTE — LETTER
March 5, 2024       No Recipients    Patient: Luis Miguel Hlaey   YOB: 1960   Date of Visit: 3/5/2024     Dear NORMA Perez:       Thank you for referring Luis Miguel Haley to me for evaluation. Below are the relevant portions of my assessment and plan of care.    If you have questions, please do not hesitate to call me. I look forward to following Luis Miguel along with you.         Sincerely,        Millie Rueda MD        CC:   No Recipients    Millie Rueda MD  03/05/24 1409  Sign when Signing Visit  Chief Complaint   Patient presents with   • Follow-up     For cardiac management - 6 months patient states no chest pain / tightness or palpitations.       Labs: done by PCP in 1/2024 results have been requested.    Medication List: provided by patient.        CARDIAC COMPLAINTS  dyspnea and fatigue        Subjective  Luis Miguel Haley is a 63 y.o. male came in today for his regular follow-up visit.  He has history of ischemic heart disease, hypertension, hypercholesterolemia and significant rheumatoid arthritis.  He was diagnosed with ischemic heart disease in 2017 when he underwent stenting of the LAD, RCA and ramus.  His last cardiac workup showed small apical ischemia.  Since he was not having any chest pain we have been managing him medically with antiplatelet therapy.  He came today stating that he has been noticing some mild increase in shortness of breath.  He also said that his rheumatoid arthritis is getting worse.  He was put back on methotrexate.  He was started on a lower dose and he is tolerating it.  His Arava was stopped and he is back on Rituxan.  During his last visit he was given papers to do lab work.  I do not have any results from that.  He at this time denies any dizziness or loss of consciousness.              Cardiac History  Past Surgical History:   Procedure Laterality Date   • CARDIAC CATHETERIZATION  04/21/2017    85% LAD- 2.5x30,2.5x30,2.5x22 Resolute. 85% RCA-  3.5x38,3.5x18,2.25x12 Resolute. 75% Ramus.   • CARDIAC CATHETERIZATION  05/19/2017    90% Ramus- 2.25x12,2.25x16,2.25x8 Promus   • CARDIOVASCULAR STRESS TEST  04/19/2017    @[facility]. R.Stress- 7 Min, 62% THR. BP- 180/80. Borderline test   • CARDIOVASCULAR STRESS TEST  07/25/2017    L.Myoview- EF 50%. Inferior Infarct   • CARDIOVASCULAR STRESS TEST  10/17/2018    L.Cardiolite- EF 57%. Inferior Infarct Vs Diapharamatic attenuation.   • CARDIOVASCULAR STRESS TEST  12/15/2021    Lexiscan- EF 57%. Small Anterior Ischemia   • CARDIOVASCULAR STRESS TEST  02/23/2023    Lexiscan- EF 57%. Small Apical Ischemia   • ECHO - CONVERTED  04/20/2017    @[facility]- EF 65%. AV nodular Thickenning   • ECHO - CONVERTED  10/17/2018    EF 55-60%   • ECHO - CONVERTED  12/15/2021    EF 60%. LA- 3.9 Cm. EDUARD- 1.8 Cm2. Mild MR. RVSP- 26 mmHg   • ECHO - CONVERTED  02/23/2023    EF 60%. LA-3.9. EDUARD- 1.6.13/27 mmHg. RVSP- 19 mmHg   • US CAROTID UNILATERAL  08/02/2022    @[facility]. 50-69% Both ICA       Current Outpatient Medications   Medication Sig Dispense Refill   • amLODIPine (NORVASC) 5 MG tablet Take 1 tablet by mouth Daily. 90 tablet 3   • aspirin (Aspirin Low Dose) 81 MG EC tablet Take 1 tablet by mouth Daily. 90 tablet 3   • atorvastatin (LIPITOR) 20 MG tablet Take 1 tablet by mouth Daily. 90 tablet 3   • Cholecalciferol (VITAMIN D3) 2000 units tablet Take 1 tablet by mouth Daily.     • folic acid (FOLVITE) 1 MG tablet Take 1 tablet by mouth Daily.     • gabapentin (NEURONTIN) 800 MG tablet Take 1 tablet by mouth 4 (Four) Times a Day.     • golimumab (Simponi Aria) 50 MG/4ML solution injection Infuse  into a venous catheter Every 6 (Six) Weeks.     • lisinopril (PRINIVIL,ZESTRIL) 5 MG tablet Take 1 tablet by mouth Daily. 90 tablet 3   • Methotrexate Sodium (methotrexate PF) 50 MG/2ML chemo syringe Infuse 0.4 mL into a venous catheter 1 (One) Time.     • nebivolol (BYSTOLIC) 5 MG tablet Take 0.5 tablets by mouth Daily. 15 tablet 11   • nitroglycerin  (NITROSTAT) 0.4 MG SL tablet DISSOLVE 1 TAB UNDER TONGUE FOR CHEST PAIN - IF PAIN REMAINS AFTER 5 MIN, CALL 911 AND REPEAT DOSE. MAX 3 TABS IN 15 MINUTES 25 tablet 10   • pantoprazole (PROTONIX) 40 MG EC tablet Take 1 tablet by mouth Daily. 30 tablet 8   • prasugrel (EFFIENT) 10 MG tablet Take 1 tablet by mouth Daily. 90 tablet 3   • vitamin B-12 (CYANOCOBALAMIN) 1000 MCG tablet Take 1 tablet by mouth Daily.     • riTUXimab (RITUXAN) 10 MG/ML solution injection Infuse 100 mg/m2 into a venous catheter 1 (One) Time.       No current facility-administered medications for this visit.       Allergies  :  Methotrexate derivatives and Orencia [abatacept]       Past Medical History:   Diagnosis Date   • Coronary artery disease    • H/O hernia repair    • Heart murmur    • Hypercholesteremia 5/10/2017   • Rheumatoid arthritis        Social History     Socioeconomic History   • Marital status:    Tobacco Use   • Smoking status: Former   • Smokeless tobacco: Never   • Tobacco comments:     quit 35 years ago   Vaping Use   • Vaping status: Never Used   Substance and Sexual Activity   • Alcohol use: Not Currently   • Drug use: No   • Sexual activity: Defer       Family History   Problem Relation Age of Onset   • Hypertension Mother    • Diabetes Mother    • Cancer Father    • Hypertension Sister        Review of Systems   Constitutional: Positive for malaise/fatigue. Negative for decreased appetite.   HENT:  Negative for congestion and sore throat.    Eyes:  Negative for blurred vision, double vision and visual disturbance.   Cardiovascular:  Positive for dyspnea on exertion. Negative for chest pain.   Respiratory:  Positive for shortness of breath. Negative for snoring.    Endocrine: Negative for cold intolerance and heat intolerance.   Hematologic/Lymphatic: Negative for adenopathy. Does not bruise/bleed easily.   Skin:  Negative for itching, nail changes and skin cancer.   Musculoskeletal:  Positive for arthritis and  "joint pain. Negative for myalgias.   Gastrointestinal:  Negative for abdominal pain, dysphagia and heartburn.   Genitourinary:  Negative for bladder incontinence and frequency.   Neurological:  Negative for dizziness, seizures and vertigo.   Psychiatric/Behavioral:  Negative for altered mental status.    Allergic/Immunologic: Negative for environmental allergies and hives.     Diabetes- No  Thyroid- normal    Objective    /80   Pulse 52   Ht 180.3 cm (71\")   Wt 94.7 kg (208 lb 12.8 oz)   SpO2 97%   BMI 29.12 kg/m²     Vitals and nursing note reviewed.   Constitutional:       Appearance: Healthy appearance. Not in distress.   Eyes:      Conjunctiva/sclera: Conjunctivae normal.      Pupils: Pupils are equal, round, and reactive to light.   HENT:      Head: Normocephalic.   Pulmonary:      Effort: Pulmonary effort is normal.      Breath sounds: Normal breath sounds.   Cardiovascular:      PMI at left midclavicular line. Bradycardia present. Regular rhythm.      Murmurs: There is a grade 4/6 harsh midsystolic murmur at the URSB, radiating to the neck.   Abdominal:      General: Bowel sounds are normal.      Palpations: Abdomen is soft.   Musculoskeletal: Normal range of motion.      Cervical back: Normal range of motion and neck supple. Skin:     General: Skin is warm and dry.   Neurological:      Mental Status: Alert, oriented to person, place, and time and oriented to person, place and time.   Procedures            @ASSESSMENT/PLAN@  BMI is >= 25 and <30. (Overweight) The following options were offered after discussion;: exercise counseling/recommendations and nutrition counseling/recommendations     Diagnoses and all orders for this visit:    1. IHD (ischemic heart disease) (Primary)    2. Essential hypertension    3. Hypercholesteremia    4. Rheumatoid arthritis involving multiple sites with positive rheumatoid factor    5. Aortic stenosis, moderate  -     Adult Transthoracic Echo Complete W/ Cont if " Necessary Per Protocol; Future    6. Shortness of breath  -     Adult Transthoracic Echo Complete W/ Cont if Necessary Per Protocol; Future    At baseline, he is mildly bradycardic.  His blood pressure is normal.  His BMI is still around 29.  His cardiovascular examination reveals 4/6 ejection systolic murmur at the aortic area.    Regarding his ischemic heart disease, he is not having any anginal symptoms.  He has undergone multiple stenting.  At this time continue his aspirin along with Effient.    Regarding his hypertension, it seems to be controlled with amlodipine, lisinopril and Bystolic.  He is advised to talk to you about his electrolytes and renal function.    Regarding his history of hypercholesterolemia, he is on Lipitor at 20 mg.  Need his lipid profile checked along with LFT.    Regarding his rheumatoid arthritis, he has been followed by the rheumatologist.  Medication changes have been made.    Regarding his aortic valvular stenosis, the last echocardiogram showed moderate aortic stenosis.  Since he is having more symptoms of shortness of breath, I like to repeat the echocardiogram.  If his aortic valve area has gone down significantly then he may need to undergo right heart catheterization and consider TAVAR    Regarding his metabolic syndrome, talked to him again about diet.    Based on the echo, further recommendations will be made.                    Electronically signed by Millie Rueda MD March 5, 2024 14:03 EST

## 2024-03-05 NOTE — PROGRESS NOTES
Chief Complaint   Patient presents with   • Follow-up     For cardiac management - 6 months patient states no chest pain / tightness or palpitations.       Labs: done by PCP in 1/2024 results have been requested.    Medication List: provided by patient.        CARDIAC COMPLAINTS  dyspnea and fatigue        Subjective   Luis Miguel Haley is a 63 y.o. male came in today for his regular follow-up visit.  He has history of ischemic heart disease, hypertension, hypercholesterolemia and significant rheumatoid arthritis.  He was diagnosed with ischemic heart disease in 2017 when he underwent stenting of the LAD, RCA and ramus.  His last cardiac workup showed small apical ischemia.  Since he was not having any chest pain we have been managing him medically with antiplatelet therapy.  He came today stating that he has been noticing some mild increase in shortness of breath.  He also said that his rheumatoid arthritis is getting worse.  He was put back on methotrexate.  He was started on a lower dose and he is tolerating it.  His Arava was stopped and he is back on Rituxan.  During his last visit he was given papers to do lab work.  I do not have any results from that.  He at this time denies any dizziness or loss of consciousness.              Cardiac History  Past Surgical History:   Procedure Laterality Date   • CARDIAC CATHETERIZATION  04/21/2017    85% LAD- 2.5x30,2.5x30,2.5x22 Resolute. 85% RCA- 3.5x38,3.5x18,2.25x12 Resolute. 75% Ramus.   • CARDIAC CATHETERIZATION  05/19/2017    90% Ramus- 2.25x12,2.25x16,2.25x8 Promus   • CARDIOVASCULAR STRESS TEST  04/19/2017    @[facility]. R.Stress- 7 Min, 62% THR. BP- 180/80. Borderline test   • CARDIOVASCULAR STRESS TEST  07/25/2017    L.Myoview- EF 50%. Inferior Infarct   • CARDIOVASCULAR STRESS TEST  10/17/2018    L.Cardiolite- EF 57%. Inferior Infarct Vs Diapharamatic attenuation.   • CARDIOVASCULAR STRESS TEST  12/15/2021    Lexiscan- EF 57%. Small Anterior Ischemia   • CARDIOVASCULAR  Will hold statin for now -- when tolerating po meds and symptoms improve, should restart this      STRESS TEST  02/23/2023    Lexiscan- EF 57%. Small Apical Ischemia   • ECHO - CONVERTED  04/20/2017    @[facility]- EF 65%. AV nodular Thickenning   • ECHO - CONVERTED  10/17/2018    EF 55-60%   • ECHO - CONVERTED  12/15/2021    EF 60%. LA- 3.9 Cm. EDUARD- 1.8 Cm2. Mild MR. RVSP- 26 mmHg   • ECHO - CONVERTED  02/23/2023    EF 60%. LA-3.9. EDUARD- 1.6.13/27 mmHg. RVSP- 19 mmHg   • US CAROTID UNILATERAL  08/02/2022    @[facility]. 50-69% Both ICA       Current Outpatient Medications   Medication Sig Dispense Refill   • amLODIPine (NORVASC) 5 MG tablet Take 1 tablet by mouth Daily. 90 tablet 3   • aspirin (Aspirin Low Dose) 81 MG EC tablet Take 1 tablet by mouth Daily. 90 tablet 3   • atorvastatin (LIPITOR) 20 MG tablet Take 1 tablet by mouth Daily. 90 tablet 3   • Cholecalciferol (VITAMIN D3) 2000 units tablet Take 1 tablet by mouth Daily.     • folic acid (FOLVITE) 1 MG tablet Take 1 tablet by mouth Daily.     • gabapentin (NEURONTIN) 800 MG tablet Take 1 tablet by mouth 4 (Four) Times a Day.     • golimumab (Simponi Aria) 50 MG/4ML solution injection Infuse  into a venous catheter Every 6 (Six) Weeks.     • lisinopril (PRINIVIL,ZESTRIL) 5 MG tablet Take 1 tablet by mouth Daily. 90 tablet 3   • Methotrexate Sodium (methotrexate PF) 50 MG/2ML chemo syringe Infuse 0.4 mL into a venous catheter 1 (One) Time.     • nebivolol (BYSTOLIC) 5 MG tablet Take 0.5 tablets by mouth Daily. 15 tablet 11   • nitroglycerin (NITROSTAT) 0.4 MG SL tablet DISSOLVE 1 TAB UNDER TONGUE FOR CHEST PAIN - IF PAIN REMAINS AFTER 5 MIN, CALL 911 AND REPEAT DOSE. MAX 3 TABS IN 15 MINUTES 25 tablet 10   • pantoprazole (PROTONIX) 40 MG EC tablet Take 1 tablet by mouth Daily. 30 tablet 8   • prasugrel (EFFIENT) 10 MG tablet Take 1 tablet by mouth Daily. 90 tablet 3   • vitamin B-12 (CYANOCOBALAMIN) 1000 MCG tablet Take 1 tablet by mouth Daily.     • riTUXimab (RITUXAN) 10 MG/ML solution injection Infuse 100 mg/m2 into a venous catheter 1 (One) Time.       No current  "facility-administered medications for this visit.       Allergies  :  Methotrexate derivatives and Orencia [abatacept]       Past Medical History:   Diagnosis Date   • Coronary artery disease    • H/O hernia repair    • Heart murmur    • Hypercholesteremia 5/10/2017   • Rheumatoid arthritis        Social History     Socioeconomic History   • Marital status:    Tobacco Use   • Smoking status: Former   • Smokeless tobacco: Never   • Tobacco comments:     quit 35 years ago   Vaping Use   • Vaping status: Never Used   Substance and Sexual Activity   • Alcohol use: Not Currently   • Drug use: No   • Sexual activity: Defer       Family History   Problem Relation Age of Onset   • Hypertension Mother    • Diabetes Mother    • Cancer Father    • Hypertension Sister        Review of Systems   Constitutional: Positive for malaise/fatigue. Negative for decreased appetite.   HENT:  Negative for congestion and sore throat.    Eyes:  Negative for blurred vision, double vision and visual disturbance.   Cardiovascular:  Positive for dyspnea on exertion. Negative for chest pain.   Respiratory:  Positive for shortness of breath. Negative for snoring.    Endocrine: Negative for cold intolerance and heat intolerance.   Hematologic/Lymphatic: Negative for adenopathy. Does not bruise/bleed easily.   Skin:  Negative for itching, nail changes and skin cancer.   Musculoskeletal:  Positive for arthritis and joint pain. Negative for myalgias.   Gastrointestinal:  Negative for abdominal pain, dysphagia and heartburn.   Genitourinary:  Negative for bladder incontinence and frequency.   Neurological:  Negative for dizziness, seizures and vertigo.   Psychiatric/Behavioral:  Negative for altered mental status.    Allergic/Immunologic: Negative for environmental allergies and hives.     Diabetes- No  Thyroid- normal    Objective     /80   Pulse 52   Ht 180.3 cm (71\")   Wt 94.7 kg (208 lb 12.8 oz)   SpO2 97%   BMI 29.12 kg/m² "     Vitals and nursing note reviewed.   Constitutional:       Appearance: Healthy appearance. Not in distress.   Eyes:      Conjunctiva/sclera: Conjunctivae normal.      Pupils: Pupils are equal, round, and reactive to light.   HENT:      Head: Normocephalic.   Pulmonary:      Effort: Pulmonary effort is normal.      Breath sounds: Normal breath sounds.   Cardiovascular:      PMI at left midclavicular line. Bradycardia present. Regular rhythm.      Murmurs: There is a grade 4/6 harsh midsystolic murmur at the URSB, radiating to the neck.   Abdominal:      General: Bowel sounds are normal.      Palpations: Abdomen is soft.   Musculoskeletal: Normal range of motion.      Cervical back: Normal range of motion and neck supple. Skin:     General: Skin is warm and dry.   Neurological:      Mental Status: Alert, oriented to person, place, and time and oriented to person, place and time.   Procedures            @ASSESSMENT/PLAN@  BMI is >= 25 and <30. (Overweight) The following options were offered after discussion;: exercise counseling/recommendations and nutrition counseling/recommendations     Diagnoses and all orders for this visit:    1. IHD (ischemic heart disease) (Primary)    2. Essential hypertension    3. Hypercholesteremia    4. Rheumatoid arthritis involving multiple sites with positive rheumatoid factor    5. Aortic stenosis, moderate  -     Adult Transthoracic Echo Complete W/ Cont if Necessary Per Protocol; Future    6. Shortness of breath  -     Adult Transthoracic Echo Complete W/ Cont if Necessary Per Protocol; Future    At baseline, he is mildly bradycardic.  His blood pressure is normal.  His BMI is still around 29.  His cardiovascular examination reveals 4/6 ejection systolic murmur at the aortic area.    Regarding his ischemic heart disease, he is not having any anginal symptoms.  He has undergone multiple stenting.  At this time continue his aspirin along with Effient.    Regarding his hypertension,  it seems to be controlled with amlodipine, lisinopril and Bystolic.  He is advised to talk to you about his electrolytes and renal function.    Regarding his history of hypercholesterolemia, he is on Lipitor at 20 mg.  Need his lipid profile checked along with LFT.    Regarding his rheumatoid arthritis, he has been followed by the rheumatologist.  Medication changes have been made.    Regarding his aortic valvular stenosis, the last echocardiogram showed moderate aortic stenosis.  Since he is having more symptoms of shortness of breath, I like to repeat the echocardiogram.  If his aortic valve area has gone down significantly then he may need to undergo right heart catheterization and consider TAVAR    Regarding his metabolic syndrome, talked to him again about diet.    Based on the echo, further recommendations will be made.                    Electronically signed by Millie Rueda MD March 5, 2024 14:03 EST

## 2024-03-11 ENCOUNTER — HOSPITAL ENCOUNTER (OUTPATIENT)
Dept: CARDIOLOGY | Facility: HOSPITAL | Age: 64
Discharge: HOME | End: 2024-03-11
Admitting: INTERNAL MEDICINE
Payer: COMMERCIAL

## 2024-03-11 VITALS — WEIGHT: 208.78 LBS | BODY MASS INDEX: 29.23 KG/M2 | HEIGHT: 71 IN

## 2024-03-11 DIAGNOSIS — R06.02 SHORTNESS OF BREATH: ICD-10-CM

## 2024-03-11 DIAGNOSIS — I35.0 AORTIC STENOSIS, MODERATE: ICD-10-CM

## 2024-03-11 LAB
AORTIC DIMENSIONLESS INDEX: 0.4 (DI)
BH CV ECHO MEAS - ACS: 0.53 CM
BH CV ECHO MEAS - AO MAX PG: 32.9 MMHG
BH CV ECHO MEAS - AO MEAN PG: 17.1 MMHG
BH CV ECHO MEAS - AO ROOT DIAM: 2.8 CM
BH CV ECHO MEAS - AO V2 MAX: 286.9 CM/SEC
BH CV ECHO MEAS - AO V2 VTI: 69.7 CM
BH CV ECHO MEAS - AVA(I,D): 1.35 CM2
BH CV ECHO MEAS - EDV(CUBED): 101.6 ML
BH CV ECHO MEAS - EF_3D-VOL: 53 %
BH CV ECHO MEAS - ESV(CUBED): 27.7 ML
BH CV ECHO MEAS - FS: 35.1 %
BH CV ECHO MEAS - IVS/LVPW: 1.15 CM
BH CV ECHO MEAS - IVSD: 1.17 CM
BH CV ECHO MEAS - LA DIMENSION: 3.9 CM
BH CV ECHO MEAS - LAT PEAK E' VEL: 11.7 CM/SEC
BH CV ECHO MEAS - LV MASS(C)D: 184.2 GRAMS
BH CV ECHO MEAS - LV MAX PG: 5.6 MMHG
BH CV ECHO MEAS - LV MEAN PG: 2.46 MMHG
BH CV ECHO MEAS - LV V1 MAX: 118.6 CM/SEC
BH CV ECHO MEAS - LV V1 VTI: 29.3 CM
BH CV ECHO MEAS - LVIDD: 4.7 CM
BH CV ECHO MEAS - LVIDS: 3 CM
BH CV ECHO MEAS - LVOT AREA: 3.2 CM2
BH CV ECHO MEAS - LVOT DIAM: 2.03 CM
BH CV ECHO MEAS - LVPWD: 1.02 CM
BH CV ECHO MEAS - MED PEAK E' VEL: 8.4 CM/SEC
BH CV ECHO MEAS - MV A MAX VEL: 90.9 CM/SEC
BH CV ECHO MEAS - MV DEC SLOPE: 424.1 CM/SEC2
BH CV ECHO MEAS - MV DEC TIME: 0.22 SEC
BH CV ECHO MEAS - MV E MAX VEL: 99.4 CM/SEC
BH CV ECHO MEAS - MV E/A: 1.09
BH CV ECHO MEAS - MV MAX PG: 4.3 MMHG
BH CV ECHO MEAS - MV MEAN PG: 1.52 MMHG
BH CV ECHO MEAS - MV P1/2T: 83.4 MSEC
BH CV ECHO MEAS - MV V2 VTI: 41.9 CM
BH CV ECHO MEAS - MVA(P1/2T): 2.6 CM2
BH CV ECHO MEAS - MVA(VTI): 2.25 CM2
BH CV ECHO MEAS - PA V2 MAX: 126.5 CM/SEC
BH CV ECHO MEAS - RAP SYSTOLE: 8 MMHG
BH CV ECHO MEAS - RV MAX PG: 1.23 MMHG
BH CV ECHO MEAS - RV V1 MAX: 55.5 CM/SEC
BH CV ECHO MEAS - RV V1 VTI: 14.4 CM
BH CV ECHO MEAS - RVDD: 3.4 CM
BH CV ECHO MEAS - RVSP: 18.4 MMHG
BH CV ECHO MEAS - SV(LVOT): 94.4 ML
BH CV ECHO MEAS - TAPSE (>1.6): 2.44 CM
BH CV ECHO MEAS - TR MAX PG: 10.4 MMHG
BH CV ECHO MEAS - TR MAX VEL: 161.3 CM/SEC
BH CV ECHO MEASUREMENTS AVERAGE E/E' RATIO: 9.89
BH CV XLRA - TDI S': 11 CM/SEC
SINUS: 2.6 CM

## 2024-03-11 PROCEDURE — 93306 TTE W/DOPPLER COMPLETE: CPT | Performed by: INTERNAL MEDICINE

## 2024-03-11 PROCEDURE — 93306 TTE W/DOPPLER COMPLETE: CPT

## 2024-09-08 DIAGNOSIS — I10 ESSENTIAL HYPERTENSION: ICD-10-CM

## 2024-09-08 DIAGNOSIS — I25.9 IHD (ISCHEMIC HEART DISEASE): ICD-10-CM

## 2024-09-11 ENCOUNTER — OFFICE VISIT (OUTPATIENT)
Dept: CARDIOLOGY | Facility: CLINIC | Age: 64
End: 2024-09-11
Payer: COMMERCIAL

## 2024-09-11 VITALS
DIASTOLIC BLOOD PRESSURE: 72 MMHG | HEIGHT: 71 IN | WEIGHT: 203.4 LBS | BODY MASS INDEX: 28.48 KG/M2 | HEART RATE: 60 BPM | SYSTOLIC BLOOD PRESSURE: 136 MMHG

## 2024-09-11 DIAGNOSIS — M05.79 RHEUMATOID ARTHRITIS INVOLVING MULTIPLE SITES WITH POSITIVE RHEUMATOID FACTOR: ICD-10-CM

## 2024-09-11 DIAGNOSIS — E88.810 METABOLIC SYNDROME: ICD-10-CM

## 2024-09-11 DIAGNOSIS — I25.9 IHD (ISCHEMIC HEART DISEASE): Primary | ICD-10-CM

## 2024-09-11 DIAGNOSIS — R42 DIZZINESS: ICD-10-CM

## 2024-09-11 DIAGNOSIS — I35.0 AORTIC STENOSIS, MODERATE: ICD-10-CM

## 2024-09-11 DIAGNOSIS — E78.00 HYPERCHOLESTEREMIA: ICD-10-CM

## 2024-09-11 DIAGNOSIS — R09.89 LEFT CAROTID BRUIT: ICD-10-CM

## 2024-09-11 DIAGNOSIS — I10 ESSENTIAL HYPERTENSION: ICD-10-CM

## 2024-09-11 PROCEDURE — 99214 OFFICE O/P EST MOD 30 MIN: CPT | Performed by: INTERNAL MEDICINE

## 2024-09-11 RX ORDER — ATORVASTATIN CALCIUM 20 MG/1
20 TABLET, FILM COATED ORAL DAILY
Qty: 90 TABLET | Refills: 3 | Status: SHIPPED | OUTPATIENT
Start: 2024-09-11

## 2024-09-11 RX ORDER — AMLODIPINE BESYLATE 5 MG/1
5 TABLET ORAL DAILY
Qty: 90 TABLET | Refills: 3 | Status: SHIPPED | OUTPATIENT
Start: 2024-09-11

## 2024-09-11 RX ORDER — LISINOPRIL 5 MG/1
5 TABLET ORAL DAILY
Qty: 90 TABLET | Refills: 3 | Status: SHIPPED | OUTPATIENT
Start: 2024-09-11

## 2024-09-11 RX ORDER — PRASUGREL 10 MG/1
10 TABLET, FILM COATED ORAL DAILY
Qty: 90 TABLET | Refills: 3 | Status: SHIPPED | OUTPATIENT
Start: 2024-09-11

## 2024-09-11 RX ORDER — AMLODIPINE BESYLATE 5 MG/1
5 TABLET ORAL DAILY
Qty: 90 TABLET | Refills: 3 | OUTPATIENT
Start: 2024-09-11

## 2024-09-11 RX ORDER — LISINOPRIL 5 MG/1
5 TABLET ORAL DAILY
Qty: 90 TABLET | Refills: 3 | OUTPATIENT
Start: 2024-09-11

## 2024-09-11 RX ORDER — NEBIVOLOL 5 MG/1
2.5 TABLET ORAL DAILY
Qty: 15 TABLET | Refills: 11 | OUTPATIENT
Start: 2024-09-11

## 2024-09-11 RX ORDER — PANTOPRAZOLE SODIUM 40 MG/1
40 TABLET, DELAYED RELEASE ORAL DAILY
Qty: 30 TABLET | Refills: 8 | Status: SHIPPED | OUTPATIENT
Start: 2024-09-11

## 2024-09-11 RX ORDER — NEBIVOLOL 5 MG/1
2.5 TABLET ORAL DAILY
Qty: 15 TABLET | Refills: 11 | Status: SHIPPED | OUTPATIENT
Start: 2024-09-11

## 2024-09-11 RX ORDER — PRASUGREL 10 MG/1
10 TABLET, FILM COATED ORAL DAILY
Qty: 90 TABLET | Refills: 3 | OUTPATIENT
Start: 2024-09-11

## 2024-09-11 NOTE — PROGRESS NOTES
Chief Complaint   Patient presents with   • Follow-up     Cardiac management.    • LABS     Current labs January 2024 per PCP are in chart in LAB tab.    • Med Refill     Needs refills on cardiac and cholesterol medications 90 day supply to [facility]       CARDIAC COMPLAINTS  Cardiac management        Subjective   Luis Miguel Haley is a 64 y.o. male came in today for his regular follow-up visit.  He has history of ischemic heart disease, hypertension, hypercholesterolemia, valvular heart disease who also has significant rheumatoid arthritis.  He had multiple stenting placed in April and May 2017.  His last stress test which was done in February 2023 had a very small apical ischemia.  He has not been having any chest pain so we have been managing him medically.  During his last visit the cardiac murmur sounded little more prominent.  Repeat echocardiogram showed that the valve area has come down to 1.4 cm.  He came today with no new cardiac symptoms.  His rheumatoid arthritis is progressively getting little worse.  He is now on methotrexate injection once a week.  He still takes Rituxan but it helps him only for first 4 months in the next 2 months he is having pain.  Apparently the insurance is not allowing him to have it every 4 months.  His lab work which was done in January looked normal.              Cardiac History  Past Surgical History:   Procedure Laterality Date   • CARDIAC CATHETERIZATION  04/21/2017    85% LAD- 2.5x30,2.5x30,2.5x22 Resolute. 85% RCA- 3.5x38,3.5x18,2.25x12 Resolute. 75% Ramus.   • CARDIAC CATHETERIZATION  05/19/2017    90% Ramus- 2.25x12,2.25x16,2.25x8 Promus   • CARDIOVASCULAR STRESS TEST  04/19/2017    @[facility] R.Stress- 7 Min, 62% THR. BP- 180/80. Borderline test   • CARDIOVASCULAR STRESS TEST  07/25/2017    L.Myoview- EF 50%. Inferior Infarct   • CARDIOVASCULAR STRESS TEST  10/17/2018    L.Cardiolite- EF 57%. Inferior Infarct Vs Diapharamatic attenuation.   • CARDIOVASCULAR STRESS  TEST  12/15/2021    Lexiscan- EF 57%. Small Anterior Ischemia   • CARDIOVASCULAR STRESS TEST  02/23/2023    Lexiscan- EF 57%. Small Apical Ischemia   • ECHO - CONVERTED  04/20/2017    @[facility]- EF 65%. AV nodular Thickenning   • ECHO - CONVERTED  10/17/2018    EF 55-60%   • ECHO - CONVERTED  12/15/2021    EF 60%. LA- 3.9 Cm. EDUARD- 1.8 Cm2. Mild MR. RVSP- 26 mmHg   • ECHO - CONVERTED  02/23/2023    EF 60%. LA-3.9. EDUARD- 1.6.13/27 mmHg. RVSP- 19 mmHg   • ECHO - CONVERTED  03/11/2024    EF 60%. LA- 3.9. EDUARD- 1.4.18/23.Trace-Mild MR. RVSP- 19 mmHg   • US CAROTID UNILATERAL  08/02/2022    @[facility]. 50-69% Both ICA       Current Outpatient Medications   Medication Sig Dispense Refill   • amLODIPine (NORVASC) 5 MG tablet Take 1 tablet by mouth Daily. 90 tablet 3   • aspirin (Aspirin Low Dose) 81 MG EC tablet Take 1 tablet by mouth Daily. 90 tablet 3   • atorvastatin (LIPITOR) 20 MG tablet Take 1 tablet by mouth Daily. 90 tablet 3   • Cholecalciferol (VITAMIN D3) 2000 units tablet Take 1 tablet by mouth Daily.     • folic acid (FOLVITE) 1 MG tablet Take 1 tablet by mouth Daily.     • gabapentin (NEURONTIN) 800 MG tablet Take 1 tablet by mouth 4 (Four) Times a Day.     • lisinopril (PRINIVIL,ZESTRIL) 5 MG tablet Take 1 tablet by mouth Daily. 90 tablet 3   • Methotrexate Sodium (methotrexate PF) 50 MG/2ML chemo syringe Infuse 0.4 mL into a venous catheter 1 (One) Time.     • nebivolol (BYSTOLIC) 5 MG tablet Take 0.5 tablets by mouth Daily. 15 tablet 11   • nitroglycerin (NITROSTAT) 0.4 MG SL tablet DISSOLVE 1 TAB UNDER TONGUE FOR CHEST PAIN - IF PAIN REMAINS AFTER 5 MIN, CALL 911 AND REPEAT DOSE. MAX 3 TABS IN 15 MINUTES 25 tablet 10   • pantoprazole (PROTONIX) 40 MG EC tablet Take 1 tablet by mouth Daily. 30 tablet 8   • prasugrel (EFFIENT) 10 MG tablet Take 1 tablet by mouth Daily. 90 tablet 3   • riTUXimab (RITUXAN) 10 MG/ML solution injection Infuse 100 mg/m2 into a venous catheter 1 (One) Time.     • vitamin B-12 (CYANOCOBALAMIN)  1000 MCG tablet Take 1 tablet by mouth Daily.       No current facility-administered medications for this visit.       Allergies  :  Methotrexate derivatives and Orencia [abatacept]       Past Medical History:   Diagnosis Date   • Coronary artery disease    • H/O hernia repair    • Heart murmur    • Hypercholesteremia 5/10/2017   • Rheumatoid arthritis        Social History     Socioeconomic History   • Marital status:    Tobacco Use   • Smoking status: Former   • Smokeless tobacco: Never   • Tobacco comments:     quit 35 years ago   Vaping Use   • Vaping status: Never Used   Substance and Sexual Activity   • Alcohol use: Not Currently   • Drug use: No   • Sexual activity: Defer       Family History   Problem Relation Age of Onset   • Hypertension Mother    • Diabetes Mother    • Cancer Father    • Hypertension Sister        Review of Systems   Constitutional: Negative for decreased appetite and malaise/fatigue.   HENT:  Negative for congestion and sore throat.    Eyes:  Negative for blurred vision, double vision and visual disturbance.   Cardiovascular:  Negative for chest pain and palpitations.   Respiratory:  Negative for shortness of breath and snoring.    Endocrine: Negative for cold intolerance and heat intolerance.   Hematologic/Lymphatic: Negative for adenopathy. Does not bruise/bleed easily.   Skin:  Negative for itching, nail changes and skin cancer.   Musculoskeletal:  Positive for arthritis and joint pain. Negative for myalgias.   Gastrointestinal:  Negative for abdominal pain, dysphagia and heartburn.   Genitourinary:  Negative for bladder incontinence and frequency.   Neurological:  Negative for dizziness, seizures and vertigo.   Psychiatric/Behavioral:  Negative for altered mental status.    Allergic/Immunologic: Negative for environmental allergies and hives.     Diabetes- No  Thyroid- normal    Objective     /72 (BP Location: Left arm, Patient Position: Sitting, Cuff Size: Adult)    "Pulse 60   Ht 180 cm (70.87\")   Wt 92.3 kg (203 lb 6.4 oz)   BMI 28.47 kg/m²     Vitals and nursing note reviewed.   Constitutional:       Appearance: Healthy appearance. Not in distress.   Eyes:      Conjunctiva/sclera: Conjunctivae normal.      Pupils: Pupils are equal, round, and reactive to light.   HENT:      Head: Normocephalic.   Neck:      Vascular: Carotid bruit present.   Pulmonary:      Effort: Pulmonary effort is normal.      Breath sounds: Normal breath sounds.   Cardiovascular:      PMI at left midclavicular line. Normal rate. Regular rhythm.      Murmurs: There is a grade 4/6 harsh midsystolic murmur at the URSB, radiating to the neck.   Abdominal:      General: Bowel sounds are normal.      Palpations: Abdomen is soft.   Musculoskeletal: Normal range of motion.      Cervical back: Normal range of motion and neck supple. Skin:     General: Skin is warm and dry.   Neurological:      Mental Status: Alert, oriented to person, place, and time and oriented to person, place and time.   Procedures            @ASSESSMENT/PLAN@        Diagnoses and all orders for this visit:    1. IHD (ischemic heart disease) (Primary)  -     amLODIPine (NORVASC) 5 MG tablet; Take 1 tablet by mouth Daily.  Dispense: 90 tablet; Refill: 3  -     nebivolol (BYSTOLIC) 5 MG tablet; Take 0.5 tablets by mouth Daily.  Dispense: 15 tablet; Refill: 11  -     prasugrel (EFFIENT) 10 MG tablet; Take 1 tablet by mouth Daily.  Dispense: 90 tablet; Refill: 3  -     CBC & Differential; Future    2. Essential hypertension  -     lisinopril (PRINIVIL,ZESTRIL) 5 MG tablet; Take 1 tablet by mouth Daily.  Dispense: 90 tablet; Refill: 3  -     amLODIPine (NORVASC) 5 MG tablet; Take 1 tablet by mouth Daily.  Dispense: 90 tablet; Refill: 3  -     nebivolol (BYSTOLIC) 5 MG tablet; Take 0.5 tablets by mouth Daily.  Dispense: 15 tablet; Refill: 11  -     Comprehensive Metabolic Panel; Future    3. Hypercholesteremia  -     atorvastatin (LIPITOR) 20 MG " tablet; Take 1 tablet by mouth Daily.  Dispense: 90 tablet; Refill: 3  -     Lipid Panel; Future    4. Rheumatoid arthritis involving multiple sites with positive rheumatoid factor  -     Sedimentation Rate; Future    5. Aortic stenosis, moderate    6. Metabolic syndrome    7. Dizziness  -     US Carotid Bilateral; Future    8. Left carotid bruit  -     US Carotid Bilateral; Future    Other orders  -     pantoprazole (PROTONIX) 40 MG EC tablet; Take 1 tablet by mouth Daily.  Dispense: 30 tablet; Refill: 8       At baseline his heart rate and blood pressure appears stable.  His BMI is still around 28.  He did lose about 5 pounds.  His cardiovascular examination reveals carotid bruit and 4/6 ejection systolic murmur.    Regarding his ischemic heart disease, he has undergone multiple stenting.  He is on Effient and aspirin.  He is not having any anginal pain.  Will continue to monitor.  He is advised to call our office if he develop any chest pain then we will take him for cardiac cath.  Meanwhile continue Norvasc and Bystolic also.  I advised him to check his blood count    Regarding his hypertension, it is very well-controlled with lisinopril, Norvasc and Bystolic.  Continue the same and have the electrolytes and renal function checked again    Regarding his hypercholesterolemia, he is on Lipitor at 20 mg.  Need to check the lipid panel and LFT    Regarding the rheumatoid arthritis, he is now taking methotrexate also.  He is advised to talk with the rheumatologist about increasing the frequency of Rituxan    Regarding his arctic stenosis, it is still only moderate.  He is not having any of the major 3 symptoms.  Will repeat an echocardiogram next year    Regarding metabolic syndrome, talked to him again about diet and weight reduction.  Encouraged him to cut down on the Carb intake    Regarding his carotid bruit, his last carotid ultrasound was in 22.  He may need to have it repeated    Overall his cardiac status  appears stable.  I will see him back in 6 months or sooner if needed                  Electronically signed by Millie Rueda MD September 11, 2024 10:55 EDT

## 2024-09-11 NOTE — LETTER
September 11, 2024       No Recipients    Patient: Luis Miguel Haley   YOB: 1960   Date of Visit: 9/11/2024     Dear NORMA Perez:       Thank you for referring Luis Miguel Haley to me for evaluation. Below are the relevant portions of my assessment and plan of care.    If you have questions, please do not hesitate to call me. I look forward to following Luis Miguel along with you.         Sincerely,        Millie Rueda MD        CC:   No Recipients    Millie Rueda MD  09/11/24 1159  Sign when Signing Visit  Chief Complaint   Patient presents with   • Follow-up     Cardiac management.    • LABS     Current labs January 2024 per PCP are in chart in LAB tab.    • Med Refill     Needs refills on cardiac and cholesterol medications 90 day supply to [facility]       CARDIAC COMPLAINTS  Cardiac management        Subjective  Luis Miguel Haley is a 64 y.o. male came in today for his regular follow-up visit.  He has history of ischemic heart disease, hypertension, hypercholesterolemia, valvular heart disease who also has significant rheumatoid arthritis.  He had multiple stenting placed in April and May 2017.  His last stress test which was done in February 2023 had a very small apical ischemia.  He has not been having any chest pain so we have been managing him medically.  During his last visit the cardiac murmur sounded little more prominent.  Repeat echocardiogram showed that the valve area has come down to 1.4 cm.  He came today with no new cardiac symptoms.  His rheumatoid arthritis is progressively getting little worse.  He is now on methotrexate injection once a week.  He still takes Rituxan but it helps him only for first 4 months in the next 2 months he is having pain.  Apparently the insurance is not allowing him to have it every 4 months.  His lab work which was done in January looked normal.              Cardiac History  Past Surgical History:   Procedure Laterality Date   •  CARDIAC CATHETERIZATION  04/21/2017    85% LAD- 2.5x30,2.5x30,2.5x22 Resolute. 85% RCA- 3.5x38,3.5x18,2.25x12 Resolute. 75% Ramus.   • CARDIAC CATHETERIZATION  05/19/2017    90% Ramus- 2.25x12,2.25x16,2.25x8 Promus   • CARDIOVASCULAR STRESS TEST  04/19/2017    @[facility]. R.Stress- 7 Min, 62% THR. BP- 180/80. Borderline test   • CARDIOVASCULAR STRESS TEST  07/25/2017    L.Myoview- EF 50%. Inferior Infarct   • CARDIOVASCULAR STRESS TEST  10/17/2018    L.Cardiolite- EF 57%. Inferior Infarct Vs Diapharamatic attenuation.   • CARDIOVASCULAR STRESS TEST  12/15/2021    Lexiscan- EF 57%. Small Anterior Ischemia   • CARDIOVASCULAR STRESS TEST  02/23/2023    Lexiscan- EF 57%. Small Apical Ischemia   • ECHO - CONVERTED  04/20/2017    @[facility]- EF 65%. AV nodular Thickenning   • ECHO - CONVERTED  10/17/2018    EF 55-60%   • ECHO - CONVERTED  12/15/2021    EF 60%. LA- 3.9 Cm. EDUARD- 1.8 Cm2. Mild MR. RVSP- 26 mmHg   • ECHO - CONVERTED  02/23/2023    EF 60%. LA-3.9. EDUARD- 1.6.13/27 mmHg. RVSP- 19 mmHg   • ECHO - CONVERTED  03/11/2024    EF 60%. LA- 3.9. EDUARD- 1.4.18/23.Trace-Mild MR. RVSP- 19 mmHg   • US CAROTID UNILATERAL  08/02/2022    @[facility]. 50-69% Both ICA       Current Outpatient Medications   Medication Sig Dispense Refill   • amLODIPine (NORVASC) 5 MG tablet Take 1 tablet by mouth Daily. 90 tablet 3   • aspirin (Aspirin Low Dose) 81 MG EC tablet Take 1 tablet by mouth Daily. 90 tablet 3   • atorvastatin (LIPITOR) 20 MG tablet Take 1 tablet by mouth Daily. 90 tablet 3   • Cholecalciferol (VITAMIN D3) 2000 units tablet Take 1 tablet by mouth Daily.     • folic acid (FOLVITE) 1 MG tablet Take 1 tablet by mouth Daily.     • gabapentin (NEURONTIN) 800 MG tablet Take 1 tablet by mouth 4 (Four) Times a Day.     • lisinopril (PRINIVIL,ZESTRIL) 5 MG tablet Take 1 tablet by mouth Daily. 90 tablet 3   • Methotrexate Sodium (methotrexate PF) 50 MG/2ML chemo syringe Infuse 0.4 mL into a venous catheter 1 (One) Time.     • nebivolol (BYSTOLIC) 5 MG  tablet Take 0.5 tablets by mouth Daily. 15 tablet 11   • nitroglycerin (NITROSTAT) 0.4 MG SL tablet DISSOLVE 1 TAB UNDER TONGUE FOR CHEST PAIN - IF PAIN REMAINS AFTER 5 MIN, CALL 911 AND REPEAT DOSE. MAX 3 TABS IN 15 MINUTES 25 tablet 10   • pantoprazole (PROTONIX) 40 MG EC tablet Take 1 tablet by mouth Daily. 30 tablet 8   • prasugrel (EFFIENT) 10 MG tablet Take 1 tablet by mouth Daily. 90 tablet 3   • riTUXimab (RITUXAN) 10 MG/ML solution injection Infuse 100 mg/m2 into a venous catheter 1 (One) Time.     • vitamin B-12 (CYANOCOBALAMIN) 1000 MCG tablet Take 1 tablet by mouth Daily.       No current facility-administered medications for this visit.       Allergies  :  Methotrexate derivatives and Orencia [abatacept]       Past Medical History:   Diagnosis Date   • Coronary artery disease    • H/O hernia repair    • Heart murmur    • Hypercholesteremia 5/10/2017   • Rheumatoid arthritis        Social History     Socioeconomic History   • Marital status:    Tobacco Use   • Smoking status: Former   • Smokeless tobacco: Never   • Tobacco comments:     quit 35 years ago   Vaping Use   • Vaping status: Never Used   Substance and Sexual Activity   • Alcohol use: Not Currently   • Drug use: No   • Sexual activity: Defer       Family History   Problem Relation Age of Onset   • Hypertension Mother    • Diabetes Mother    • Cancer Father    • Hypertension Sister        Review of Systems   Constitutional: Negative for decreased appetite and malaise/fatigue.   HENT:  Negative for congestion and sore throat.    Eyes:  Negative for blurred vision, double vision and visual disturbance.   Cardiovascular:  Negative for chest pain and palpitations.   Respiratory:  Negative for shortness of breath and snoring.    Endocrine: Negative for cold intolerance and heat intolerance.   Hematologic/Lymphatic: Negative for adenopathy. Does not bruise/bleed easily.   Skin:  Negative for itching, nail changes and skin cancer.  "  Musculoskeletal:  Positive for arthritis and joint pain. Negative for myalgias.   Gastrointestinal:  Negative for abdominal pain, dysphagia and heartburn.   Genitourinary:  Negative for bladder incontinence and frequency.   Neurological:  Negative for dizziness, seizures and vertigo.   Psychiatric/Behavioral:  Negative for altered mental status.    Allergic/Immunologic: Negative for environmental allergies and hives.     Diabetes- No  Thyroid- normal    Objective    /72 (BP Location: Left arm, Patient Position: Sitting, Cuff Size: Adult)   Pulse 60   Ht 180 cm (70.87\")   Wt 92.3 kg (203 lb 6.4 oz)   BMI 28.47 kg/m²     Vitals and nursing note reviewed.   Constitutional:       Appearance: Healthy appearance. Not in distress.   Eyes:      Conjunctiva/sclera: Conjunctivae normal.      Pupils: Pupils are equal, round, and reactive to light.   HENT:      Head: Normocephalic.   Neck:      Vascular: Carotid bruit present.   Pulmonary:      Effort: Pulmonary effort is normal.      Breath sounds: Normal breath sounds.   Cardiovascular:      PMI at left midclavicular line. Normal rate. Regular rhythm.      Murmurs: There is a grade 4/6 harsh midsystolic murmur at the URSB, radiating to the neck.   Abdominal:      General: Bowel sounds are normal.      Palpations: Abdomen is soft.   Musculoskeletal: Normal range of motion.      Cervical back: Normal range of motion and neck supple. Skin:     General: Skin is warm and dry.   Neurological:      Mental Status: Alert, oriented to person, place, and time and oriented to person, place and time.   Procedures            @ASSESSMENT/PLAN@        Diagnoses and all orders for this visit:    1. IHD (ischemic heart disease) (Primary)  -     amLODIPine (NORVASC) 5 MG tablet; Take 1 tablet by mouth Daily.  Dispense: 90 tablet; Refill: 3  -     nebivolol (BYSTOLIC) 5 MG tablet; Take 0.5 tablets by mouth Daily.  Dispense: 15 tablet; Refill: 11  -     prasugrel (EFFIENT) 10 MG " tablet; Take 1 tablet by mouth Daily.  Dispense: 90 tablet; Refill: 3  -     CBC & Differential; Future    2. Essential hypertension  -     lisinopril (PRINIVIL,ZESTRIL) 5 MG tablet; Take 1 tablet by mouth Daily.  Dispense: 90 tablet; Refill: 3  -     amLODIPine (NORVASC) 5 MG tablet; Take 1 tablet by mouth Daily.  Dispense: 90 tablet; Refill: 3  -     nebivolol (BYSTOLIC) 5 MG tablet; Take 0.5 tablets by mouth Daily.  Dispense: 15 tablet; Refill: 11  -     Comprehensive Metabolic Panel; Future    3. Hypercholesteremia  -     atorvastatin (LIPITOR) 20 MG tablet; Take 1 tablet by mouth Daily.  Dispense: 90 tablet; Refill: 3  -     Lipid Panel; Future    4. Rheumatoid arthritis involving multiple sites with positive rheumatoid factor  -     Sedimentation Rate; Future    5. Aortic stenosis, moderate    6. Metabolic syndrome    7. Dizziness  -     US Carotid Bilateral; Future    8. Left carotid bruit  -     US Carotid Bilateral; Future    Other orders  -     pantoprazole (PROTONIX) 40 MG EC tablet; Take 1 tablet by mouth Daily.  Dispense: 30 tablet; Refill: 8       At baseline his heart rate and blood pressure appears stable.  His BMI is still around 28.  He did lose about 5 pounds.  His cardiovascular examination reveals carotid bruit and 4/6 ejection systolic murmur.    Regarding his ischemic heart disease, he has undergone multiple stenting.  He is on Effient and aspirin.  He is not having any anginal pain.  Will continue to monitor.  He is advised to call our office if he develop any chest pain then we will take him for cardiac cath.  Meanwhile continue Norvasc and Bystolic also.  I advised him to check his blood count    Regarding his hypertension, it is very well-controlled with lisinopril, Norvasc and Bystolic.  Continue the same and have the electrolytes and renal function checked again    Regarding his hypercholesterolemia, he is on Lipitor at 20 mg.  Need to check the lipid panel and LFT    Regarding the  rheumatoid arthritis, he is now taking methotrexate also.  He is advised to talk with the rheumatologist about increasing the frequency of Rituxan    Regarding his arctic stenosis, it is still only moderate.  He is not having any of the major 3 symptoms.  Will repeat an echocardiogram next year    Regarding metabolic syndrome, talked to him again about diet and weight reduction.  Encouraged him to cut down on the Carb intake    Regarding his carotid bruit, his last carotid ultrasound was in 22.  He may need to have it repeated    Overall his cardiac status appears stable.  I will see him back in 6 months or sooner if needed                  Electronically signed by Millie Rueda MD September 11, 2024 10:55 EDT

## 2024-10-15 ENCOUNTER — TELEPHONE (OUTPATIENT)
Dept: CARDIOLOGY | Facility: CLINIC | Age: 64
End: 2024-10-15
Payer: COMMERCIAL

## 2024-10-15 NOTE — TELEPHONE ENCOUNTER
Received fax from [facility] for cardiac clearance for patient to have a TESI. Patient is on Effient and they are requesting to hold for 7 days prior to procedure. According to our records, patient's last stenting was done on 05/19/17.           Fax [phone]

## 2024-10-20 DIAGNOSIS — I25.9 IHD (ISCHEMIC HEART DISEASE): ICD-10-CM

## 2024-10-21 RX ORDER — ASPIRIN 81 MG/1
81 TABLET, COATED ORAL DAILY
Qty: 90 TABLET | Refills: 3 | Status: SHIPPED | OUTPATIENT
Start: 2024-10-21

## 2024-10-21 NOTE — TELEPHONE ENCOUNTER
Rx Refill Note  Requested Prescriptions     Pending Prescriptions Disp Refills    Aspirin Low Dose 81 MG EC tablet [Pharmacy Med Name: ASPIRIN EC 81 MG TABLET] 90 tablet 3     Sig: TAKE 1 TABLET BY MOUTH DAILY      Last office visit with prescribing clinician: 9/11/2024   Last telemedicine visit with prescribing clinician: Visit date not found   Next office visit with prescribing clinician: 3/17/2025                         Would you like a call back once the refill request has been completed: [] Yes [] No    If the office needs to give you a call back, can they leave a voicemail: [] Yes [] No    Haily Leal CMA  10/21/24, 14:45 EDT

## 2025-02-21 ENCOUNTER — TELEPHONE (OUTPATIENT)
Dept: CARDIOLOGY | Facility: CLINIC | Age: 65
End: 2025-02-21
Payer: COMMERCIAL

## 2025-02-21 NOTE — TELEPHONE ENCOUNTER
Received fax from Dr. Wallace for cardiac clearance for patient to have a C7-T1 CHEO. Patient is on Effient and they are requesting to hod for 7 days (Rosio from their office called stating that patient has already held for 6 days.) According to our records, patient's last stenting was done on 05/19/17.          Fax [phone]

## 2025-03-17 ENCOUNTER — OFFICE VISIT (OUTPATIENT)
Dept: CARDIOLOGY | Facility: CLINIC | Age: 65
End: 2025-03-17
Payer: COMMERCIAL

## 2025-03-17 VITALS
BODY MASS INDEX: 29.35 KG/M2 | SYSTOLIC BLOOD PRESSURE: 144 MMHG | WEIGHT: 205 LBS | HEART RATE: 64 BPM | DIASTOLIC BLOOD PRESSURE: 90 MMHG | HEIGHT: 70 IN

## 2025-03-17 DIAGNOSIS — I10 ESSENTIAL HYPERTENSION: ICD-10-CM

## 2025-03-17 DIAGNOSIS — M05.79 RHEUMATOID ARTHRITIS INVOLVING MULTIPLE SITES WITH POSITIVE RHEUMATOID FACTOR: ICD-10-CM

## 2025-03-17 DIAGNOSIS — R06.02 SHORTNESS OF BREATH: ICD-10-CM

## 2025-03-17 DIAGNOSIS — I25.9 IHD (ISCHEMIC HEART DISEASE): Primary | ICD-10-CM

## 2025-03-17 DIAGNOSIS — I35.0 AORTIC STENOSIS, MODERATE: ICD-10-CM

## 2025-03-17 DIAGNOSIS — R07.89 CHEST PRESSURE: ICD-10-CM

## 2025-03-17 DIAGNOSIS — E78.00 HYPERCHOLESTEREMIA: ICD-10-CM

## 2025-03-17 PROCEDURE — 99214 OFFICE O/P EST MOD 30 MIN: CPT | Performed by: INTERNAL MEDICINE

## 2025-03-17 RX ORDER — PRASUGREL 10 MG/1
10 TABLET, FILM COATED ORAL DAILY
Qty: 90 TABLET | Refills: 3 | Status: SHIPPED | OUTPATIENT
Start: 2025-03-17

## 2025-03-17 RX ORDER — ASPIRIN 81 MG/1
81 TABLET ORAL DAILY
Qty: 90 TABLET | Refills: 3 | Status: SHIPPED | OUTPATIENT
Start: 2025-03-17

## 2025-03-17 RX ORDER — NEBIVOLOL 5 MG/1
2.5 TABLET ORAL DAILY
Qty: 15 TABLET | Refills: 11 | Status: SHIPPED | OUTPATIENT
Start: 2025-03-17

## 2025-03-17 RX ORDER — LISINOPRIL 10 MG/1
10 TABLET ORAL DAILY
Qty: 90 TABLET | Refills: 3 | Status: SHIPPED | OUTPATIENT
Start: 2025-03-17

## 2025-03-17 RX ORDER — AMLODIPINE BESYLATE 5 MG/1
5 TABLET ORAL DAILY
Qty: 90 TABLET | Refills: 3 | Status: SHIPPED | OUTPATIENT
Start: 2025-03-17

## 2025-03-17 RX ORDER — NITROGLYCERIN 0.4 MG/1
TABLET SUBLINGUAL
Qty: 25 TABLET | Refills: 10 | Status: SHIPPED | OUTPATIENT
Start: 2025-03-17

## 2025-03-17 RX ORDER — ATORVASTATIN CALCIUM 20 MG/1
20 TABLET, FILM COATED ORAL DAILY
Qty: 90 TABLET | Refills: 3 | Status: SHIPPED | OUTPATIENT
Start: 2025-03-17

## 2025-03-17 NOTE — LETTER
March 17, 2025     NORMA Perez  [address]  [place] [address]    Patient: Luis Miguel Haley   YOB: 1960   Date of Visit: 3/17/2025     Dear NORMA Perez:       Thank you for referring Luis Miguel Haley to me for evaluation. Below are the relevant portions of my assessment and plan of care.    If you have questions, please do not hesitate to call me. I look forward to following Luis Miguel along with you.         Sincerely,        Millie Rueda MD        CC: No Recipients    Millie Rueda MD  03/17/25 1219  Sign when Signing Visit  Chief Complaint   Patient presents with   • Follow-up     For cardiac management, having more SOB with exertion than before, more fatigued. Chest heaviness with some episodes of the SOB.    • Labs     Labs for PCP and Rheumatologist about 2 months ago.    • Rheumatoid Arthritis     Has been having more problems with his arthritis this winter, unable to do much activity, has had multiple vials of fluid drawn off his left knee. Has also had injections in his shoulder and epidural in C5.    • Med Refill     Refills needed on cardiac meds. 90 days to [facility] in [place].    • Medication Problem     Pt didn't bring a list or meds. Verbally went over our list, he is going to call back with the new gout medication they recently started.        CARDIAC COMPLAINTS  chest pressure/discomfort, dyspnea, and fatigue        Subjective  Luis Miguel Haley is a 64 y.o. male came in today for his follow-up visit.  He has a complex medical problem.  He has significant rheumatoid arthritis and also ischemic and valvular heart disease.  He also has history of hypertension and hypercholesterolemia.  He was first diagnosed with ischemic heart disease in 2017 when he presented with chest pain and abnormal stress test.  He underwent multiple stenting of the LAD and RCA.  He was brought back a month later and had 3 stents placed in this ramus.  Since then he has  done fairly well with medication.  In 2023 was noted to have small apical ischemia and since he was asymptomatic he has been managing medically.  He also has aortic stenosis in the last echocardiogram in 2024 showed it was around 1.4 cm2.  He came today stating that his rheumatoid arthritis is really acting up over the wintertime he had fluid drawn off his left knee.  He also had injection given in his shoulder and epidural and feeling better.  He continues to take medication for rheumatoid arthritis but the flareup is more frequent.  He also started noticing chest heaviness when he exerts himself and also shortness of breath.  His lab work is essentially followed at your office.              Cardiac History  Past Surgical History:   Procedure Laterality Date   • CARDIAC CATHETERIZATION  04/21/2017    85% LAD- 2.5x30,2.5x30,2.5x22 Resolute. 85% RCA- 3.5x38,3.5x18,2.25x12 Resolute. 75% Ramus.   • CARDIAC CATHETERIZATION  05/19/2017    90% Ramus- 2.25x12,2.25x16,2.25x8 Promus   • CARDIOVASCULAR STRESS TEST  04/19/2017    @[facility]. R.Stress- 7 Min, 62% THR. BP- 180/80. Borderline test   • CARDIOVASCULAR STRESS TEST  07/25/2017    L.Myoview- EF 50%. Inferior Infarct   • CARDIOVASCULAR STRESS TEST  10/17/2018    L.Cardiolite- EF 57%. Inferior Infarct Vs Diapharamatic attenuation.   • CARDIOVASCULAR STRESS TEST  12/15/2021    Lexiscan- EF 57%. Small Anterior Ischemia   • CARDIOVASCULAR STRESS TEST  02/23/2023    Lexiscan- EF 57%. Small Apical Ischemia   • ECHO - CONVERTED  04/20/2017    @[facility]- EF 65%. AV nodular Thickenning   • ECHO - CONVERTED  10/17/2018    EF 55-60%   • ECHO - CONVERTED  12/15/2021    EF 60%. LA- 3.9 Cm. EDUARD- 1.8 Cm2. Mild MR. RVSP- 26 mmHg   • ECHO - CONVERTED  02/23/2023    EF 60%. LA-3.9. EDUARD- 1.6.13/27 mmHg. RVSP- 19 mmHg   • ECHO - CONVERTED  03/11/2024    EF 60%. LA- 3.9. EDUARD- 1.4.18/23.Trace-Mild MR. RVSP- 19 mmHg   • US CAROTID UNILATERAL  08/02/2022    @[facility]. 50-69% Both ICA       Current  Outpatient Medications   Medication Sig Dispense Refill   • amLODIPine (NORVASC) 5 MG tablet Take 1 tablet by mouth Daily. 90 tablet 3   • aspirin (Aspirin Low Dose) 81 MG EC tablet Take 1 tablet by mouth Daily. 90 tablet 3   • atorvastatin (LIPITOR) 20 MG tablet Take 1 tablet by mouth Daily. 90 tablet 3   • Cholecalciferol (VITAMIN D3) 2000 units tablet Take 1 tablet by mouth Daily.     • folic acid (FOLVITE) 1 MG tablet Take 1 tablet by mouth Daily.     • gabapentin (NEURONTIN) 800 MG tablet Take 1 tablet by mouth 4 (Four) Times a Day.     • Methotrexate Sodium (methotrexate PF) 50 MG/2ML chemo syringe Infuse 0.4 mL into a venous catheter 1 (One) Time.     • nebivolol (BYSTOLIC) 5 MG tablet Take 0.5 tablets by mouth Daily. 15 tablet 11   • nitroglycerin (NITROSTAT) 0.4 MG SL tablet DISSOLVE 1 TAB UNDER TONGUE FOR CHEST PAIN - IF PAIN REMAINS AFTER 5 MIN, CALL 911 AND REPEAT DOSE. MAX 3 TABS IN 15 MINUTES 25 tablet 10   • pantoprazole (PROTONIX) 40 MG EC tablet Take 1 tablet by mouth Daily. 30 tablet 8   • prasugrel (EFFIENT) 10 MG tablet Take 1 tablet by mouth Daily. 90 tablet 3   • riTUXimab (RITUXAN) 10 MG/ML solution injection Infuse 100 mg/m2 into a venous catheter 1 (One) Time.     • vitamin B-12 (CYANOCOBALAMIN) 1000 MCG tablet Take 1 tablet by mouth Daily.     • lisinopril (PRINIVIL,ZESTRIL) 10 MG tablet Take 1 tablet by mouth Daily. 90 tablet 3     No current facility-administered medications for this visit.       Allergies  :  Orencia [abatacept] and Methotrexate derivatives       Past Medical History:   Diagnosis Date   • Coronary artery disease    • H/O hernia repair    • Heart murmur    • Hypercholesteremia 5/10/2017   • Rheumatoid arthritis        Social History     Socioeconomic History   • Marital status:    Tobacco Use   • Smoking status: Former   • Smokeless tobacco: Never   • Tobacco comments:     quit 35 years ago   Vaping Use   • Vaping status: Never Used   Substance and Sexual Activity  "  • Alcohol use: Not Currently   • Drug use: No   • Sexual activity: Defer       Family History   Problem Relation Age of Onset   • Hypertension Mother    • Diabetes Mother    • Cancer Father    • Hypertension Sister        Review of Systems   Constitutional: Positive for malaise/fatigue. Negative for decreased appetite.   HENT:  Negative for congestion and sore throat.    Eyes:  Negative for blurred vision, double vision and visual disturbance.   Cardiovascular:  Positive for chest pain and dyspnea on exertion.   Respiratory:  Positive for shortness of breath. Negative for snoring.    Endocrine: Negative for cold intolerance and heat intolerance.   Hematologic/Lymphatic: Negative for adenopathy. Does not bruise/bleed easily.   Skin:  Negative for itching, nail changes and skin cancer.   Musculoskeletal:  Positive for arthritis and joint swelling. Negative for myalgias.   Gastrointestinal:  Negative for abdominal pain, dysphagia and heartburn.   Genitourinary:  Negative for bladder incontinence and frequency.   Neurological:  Negative for dizziness, seizures and vertigo.   Psychiatric/Behavioral:  Negative for altered mental status.    Allergic/Immunologic: Negative for environmental allergies and hives.     Diabetes- No  Thyroid- normal    Objective    /90   Pulse 64   Ht 177.8 cm (70\")   Wt 93 kg (205 lb)   BMI 29.41 kg/m²     Vitals and nursing note reviewed.   Constitutional:       Appearance: Healthy appearance. Not in distress.   Eyes:      Conjunctiva/sclera: Conjunctivae normal.      Pupils: Pupils are equal, round, and reactive to light.   HENT:      Head: Normocephalic.   Pulmonary:      Effort: Pulmonary effort is normal.      Breath sounds: Normal breath sounds.   Cardiovascular:      PMI at left midclavicular line. Normal rate. Regular rhythm.      Murmurs: There is a grade 4/6 harsh midsystolic murmur at the URSB, radiating to the neck. There is also a grade 3/6 high frequency blowing " holosystolic murmur at the apex.   Abdominal:      General: Bowel sounds are normal.      Palpations: Abdomen is soft.   Musculoskeletal: Normal range of motion.      Cervical back: Normal range of motion and neck supple. Skin:     General: Skin is warm and dry.   Neurological:      Mental Status: Alert, oriented to person, place, and time and oriented to person, place and time.   Procedures            @ASSESSMENT/PLAN@  BMI is >= 25 and <30. (Overweight) The following options were offered after discussion;: nutrition counseling/recommendations     Diagnoses and all orders for this visit:    1. IHD (ischemic heart disease) (Primary)  -     amLODIPine (NORVASC) 5 MG tablet; Take 1 tablet by mouth Daily.  Dispense: 90 tablet; Refill: 3  -     nebivolol (BYSTOLIC) 5 MG tablet; Take 0.5 tablets by mouth Daily.  Dispense: 15 tablet; Refill: 11  -     aspirin (Aspirin Low Dose) 81 MG EC tablet; Take 1 tablet by mouth Daily.  Dispense: 90 tablet; Refill: 3  -     nitroglycerin (NITROSTAT) 0.4 MG SL tablet; DISSOLVE 1 TAB UNDER TONGUE FOR CHEST PAIN - IF PAIN REMAINS AFTER 5 MIN, CALL 911 AND REPEAT DOSE. MAX 3 TABS IN 15 MINUTES  Dispense: 25 tablet; Refill: 10  -     prasugrel (EFFIENT) 10 MG tablet; Take 1 tablet by mouth Daily.  Dispense: 90 tablet; Refill: 3  -     Stress Test With Myocardial Perfusion One Day; Future    2. Essential hypertension  -     lisinopril (PRINIVIL,ZESTRIL) 10 MG tablet; Take 1 tablet by mouth Daily.  Dispense: 90 tablet; Refill: 3  -     amLODIPine (NORVASC) 5 MG tablet; Take 1 tablet by mouth Daily.  Dispense: 90 tablet; Refill: 3  -     nebivolol (BYSTOLIC) 5 MG tablet; Take 0.5 tablets by mouth Daily.  Dispense: 15 tablet; Refill: 11  -     Comprehensive Metabolic Panel; Future    3. Hypercholesteremia  -     atorvastatin (LIPITOR) 20 MG tablet; Take 1 tablet by mouth Daily.  Dispense: 90 tablet; Refill: 3  -     Lipid Panel; Future    4. Aortic stenosis, moderate  -     Adult  Transthoracic Echo Complete W/ Cont if Necessary Per Protocol; Future  -     proBNP; Future    5. Rheumatoid arthritis involving multiple sites with positive rheumatoid factor  -     Sedimentation Rate; Future  -     Rheumatoid Factor; Future  -     CBC & Differential; Future    6. Chest pressure  -     Stress Test With Myocardial Perfusion One Day; Future  -     High Sensitivity CRP; Future    7. Shortness of breath  -     Adult Transthoracic Echo Complete W/ Cont if Necessary Per Protocol; Future  -     proBNP; Future       At baseline his heart rate is stable but the blood pressure is mildly elevated.  His BMI is still around 29.  His cardiovascular examination reveals an ejection systolic murmur at the aortic area.    Regarding his ischemic heart disease, he is on aspirin and Effient.  He also takes Bystolic.  I talked with him about the previous stress test done about 2 years ago.  I like to repeat the stress test and if the area of ischemia is bigger, then he may need to undergo repeat cardiac catheterization.  Meanwhile he is advised to take the nitroglycerin as needed.    His blood pressure is elevated.  He is on a calcium channel blocker, beta-blocker and ACE inhibitor.  I increased the dose of lisinopril to 10 mg once a day.  He is advised to have his electrolytes and renal function checked again    He is on moderate dose of Lipitor for his hypercholesterolemia.  I like to have it rechecked and try to keep the LDL less than 70 if possible.  He also need his LFT checked    Regarding his arctic stenosis, it was moderate about 2 years ago.  The murmur seems to be little bit louder and occurring earlier.  I like to repeat the echocardiogram to reevaluate the valve area    Regarding his rheumatoid arthritis he has been followed by the rheumatologist.  He is advised to talk to them about the medication    The chest pressure he is complaining is little worrisome.  Will check his CRP level and a stress test in the  form of Lexiscan    His shortness of breath could be related to his lack of mobility.  I like to rule out cardiac cause so we will repeat the echocardiogram.  Will check his BNP level also    Based on the results, further recommendations will be made                  Electronically signed by Millie Rueda MD March 17, 2025 12:18 EDT

## 2025-03-17 NOTE — PROGRESS NOTES
Chief Complaint   Patient presents with   • Follow-up     For cardiac management, having more SOB with exertion than before, more fatigued. Chest heaviness with some episodes of the SOB.    • Labs     Labs for PCP and Rheumatologist about 2 months ago.    • Rheumatoid Arthritis     Has been having more problems with his arthritis this winter, unable to do much activity, has had multiple vials of fluid drawn off his left knee. Has also had injections in his shoulder and epidural in C5.    • Med Refill     Refills needed on cardiac meds. 90 days to [facility] in [place].    • Medication Problem     Pt didn't bring a list or meds. Verbally went over our list, he is going to call back with the new gout medication they recently started.        CARDIAC COMPLAINTS  chest pressure/discomfort, dyspnea, and fatigue        Subjective   Luis Miguel Haley is a 64 y.o. male came in today for his follow-up visit.  He has a complex medical problem.  He has significant rheumatoid arthritis and also ischemic and valvular heart disease.  He also has history of hypertension and hypercholesterolemia.  He was first diagnosed with ischemic heart disease in 2017 when he presented with chest pain and abnormal stress test.  He underwent multiple stenting of the LAD and RCA.  He was brought back a month later and had 3 stents placed in this ramus.  Since then he has done fairly well with medication.  In 2023 was noted to have small apical ischemia and since he was asymptomatic he has been managing medically.  He also has aortic stenosis in the last echocardiogram in 2024 showed it was around 1.4 cm2.  He came today stating that his rheumatoid arthritis is really acting up over the wintertime he had fluid drawn off his left knee.  He also had injection given in his shoulder and epidural and feeling better.  He continues to take medication for rheumatoid arthritis but the flareup is more frequent.  He also started noticing chest heaviness when  he exerts himself and also shortness of breath.  His lab work is essentially followed at your office.              Cardiac History  Past Surgical History:   Procedure Laterality Date   • CARDIAC CATHETERIZATION  04/21/2017    85% LAD- 2.5x30,2.5x30,2.5x22 Resolute. 85% RCA- 3.5x38,3.5x18,2.25x12 Resolute. 75% Ramus.   • CARDIAC CATHETERIZATION  05/19/2017    90% Ramus- 2.25x12,2.25x16,2.25x8 Promus   • CARDIOVASCULAR STRESS TEST  04/19/2017    @[facility]. R.Stress- 7 Min, 62% THR. BP- 180/80. Borderline test   • CARDIOVASCULAR STRESS TEST  07/25/2017    L.Myoview- EF 50%. Inferior Infarct   • CARDIOVASCULAR STRESS TEST  10/17/2018    L.Cardiolite- EF 57%. Inferior Infarct Vs Diapharamatic attenuation.   • CARDIOVASCULAR STRESS TEST  12/15/2021    Lexiscan- EF 57%. Small Anterior Ischemia   • CARDIOVASCULAR STRESS TEST  02/23/2023    Lexiscan- EF 57%. Small Apical Ischemia   • ECHO - CONVERTED  04/20/2017    @[facility]- EF 65%. AV nodular Thickenning   • ECHO - CONVERTED  10/17/2018    EF 55-60%   • ECHO - CONVERTED  12/15/2021    EF 60%. LA- 3.9 Cm. EDUARD- 1.8 Cm2. Mild MR. RVSP- 26 mmHg   • ECHO - CONVERTED  02/23/2023    EF 60%. LA-3.9. EDUARD- 1.6.13/27 mmHg. RVSP- 19 mmHg   • ECHO - CONVERTED  03/11/2024    EF 60%. LA- 3.9. EDUARD- 1.4.18/23.Trace-Mild MR. RVSP- 19 mmHg   • US CAROTID UNILATERAL  08/02/2022    @[facility]. 50-69% Both ICA       Current Outpatient Medications   Medication Sig Dispense Refill   • amLODIPine (NORVASC) 5 MG tablet Take 1 tablet by mouth Daily. 90 tablet 3   • aspirin (Aspirin Low Dose) 81 MG EC tablet Take 1 tablet by mouth Daily. 90 tablet 3   • atorvastatin (LIPITOR) 20 MG tablet Take 1 tablet by mouth Daily. 90 tablet 3   • Cholecalciferol (VITAMIN D3) 2000 units tablet Take 1 tablet by mouth Daily.     • folic acid (FOLVITE) 1 MG tablet Take 1 tablet by mouth Daily.     • gabapentin (NEURONTIN) 800 MG tablet Take 1 tablet by mouth 4 (Four) Times a Day.     • Methotrexate Sodium (methotrexate PF) 50  MG/2ML chemo syringe Infuse 0.4 mL into a venous catheter 1 (One) Time.     • nebivolol (BYSTOLIC) 5 MG tablet Take 0.5 tablets by mouth Daily. 15 tablet 11   • nitroglycerin (NITROSTAT) 0.4 MG SL tablet DISSOLVE 1 TAB UNDER TONGUE FOR CHEST PAIN - IF PAIN REMAINS AFTER 5 MIN, CALL 911 AND REPEAT DOSE. MAX 3 TABS IN 15 MINUTES 25 tablet 10   • pantoprazole (PROTONIX) 40 MG EC tablet Take 1 tablet by mouth Daily. 30 tablet 8   • prasugrel (EFFIENT) 10 MG tablet Take 1 tablet by mouth Daily. 90 tablet 3   • riTUXimab (RITUXAN) 10 MG/ML solution injection Infuse 100 mg/m2 into a venous catheter 1 (One) Time.     • vitamin B-12 (CYANOCOBALAMIN) 1000 MCG tablet Take 1 tablet by mouth Daily.     • lisinopril (PRINIVIL,ZESTRIL) 10 MG tablet Take 1 tablet by mouth Daily. 90 tablet 3     No current facility-administered medications for this visit.       Allergies  :  Orencia [abatacept] and Methotrexate derivatives       Past Medical History:   Diagnosis Date   • Coronary artery disease    • H/O hernia repair    • Heart murmur    • Hypercholesteremia 5/10/2017   • Rheumatoid arthritis        Social History     Socioeconomic History   • Marital status:    Tobacco Use   • Smoking status: Former   • Smokeless tobacco: Never   • Tobacco comments:     quit 35 years ago   Vaping Use   • Vaping status: Never Used   Substance and Sexual Activity   • Alcohol use: Not Currently   • Drug use: No   • Sexual activity: Defer       Family History   Problem Relation Age of Onset   • Hypertension Mother    • Diabetes Mother    • Cancer Father    • Hypertension Sister        Review of Systems   Constitutional: Positive for malaise/fatigue. Negative for decreased appetite.   HENT:  Negative for congestion and sore throat.    Eyes:  Negative for blurred vision, double vision and visual disturbance.   Cardiovascular:  Positive for chest pain and dyspnea on exertion.   Respiratory:  Positive for shortness of breath. Negative for snoring.   "  Endocrine: Negative for cold intolerance and heat intolerance.   Hematologic/Lymphatic: Negative for adenopathy. Does not bruise/bleed easily.   Skin:  Negative for itching, nail changes and skin cancer.   Musculoskeletal:  Positive for arthritis and joint swelling. Negative for myalgias.   Gastrointestinal:  Negative for abdominal pain, dysphagia and heartburn.   Genitourinary:  Negative for bladder incontinence and frequency.   Neurological:  Negative for dizziness, seizures and vertigo.   Psychiatric/Behavioral:  Negative for altered mental status.    Allergic/Immunologic: Negative for environmental allergies and hives.     Diabetes- No  Thyroid- normal    Objective     /90   Pulse 64   Ht 177.8 cm (70\")   Wt 93 kg (205 lb)   BMI 29.41 kg/m²     Vitals and nursing note reviewed.   Constitutional:       Appearance: Healthy appearance. Not in distress.   Eyes:      Conjunctiva/sclera: Conjunctivae normal.      Pupils: Pupils are equal, round, and reactive to light.   HENT:      Head: Normocephalic.   Pulmonary:      Effort: Pulmonary effort is normal.      Breath sounds: Normal breath sounds.   Cardiovascular:      PMI at left midclavicular line. Normal rate. Regular rhythm.      Murmurs: There is a grade 4/6 harsh midsystolic murmur at the URSB, radiating to the neck. There is also a grade 3/6 high frequency blowing holosystolic murmur at the apex.   Abdominal:      General: Bowel sounds are normal.      Palpations: Abdomen is soft.   Musculoskeletal: Normal range of motion.      Cervical back: Normal range of motion and neck supple. Skin:     General: Skin is warm and dry.   Neurological:      Mental Status: Alert, oriented to person, place, and time and oriented to person, place and time.   Procedures            @ASSESSMENT/PLAN@  BMI is >= 25 and <30. (Overweight) The following options were offered after discussion;: nutrition counseling/recommendations     Diagnoses and all orders for this " visit:    1. IHD (ischemic heart disease) (Primary)  -     amLODIPine (NORVASC) 5 MG tablet; Take 1 tablet by mouth Daily.  Dispense: 90 tablet; Refill: 3  -     nebivolol (BYSTOLIC) 5 MG tablet; Take 0.5 tablets by mouth Daily.  Dispense: 15 tablet; Refill: 11  -     aspirin (Aspirin Low Dose) 81 MG EC tablet; Take 1 tablet by mouth Daily.  Dispense: 90 tablet; Refill: 3  -     nitroglycerin (NITROSTAT) 0.4 MG SL tablet; DISSOLVE 1 TAB UNDER TONGUE FOR CHEST PAIN - IF PAIN REMAINS AFTER 5 MIN, CALL 911 AND REPEAT DOSE. MAX 3 TABS IN 15 MINUTES  Dispense: 25 tablet; Refill: 10  -     prasugrel (EFFIENT) 10 MG tablet; Take 1 tablet by mouth Daily.  Dispense: 90 tablet; Refill: 3  -     Stress Test With Myocardial Perfusion One Day; Future    2. Essential hypertension  -     lisinopril (PRINIVIL,ZESTRIL) 10 MG tablet; Take 1 tablet by mouth Daily.  Dispense: 90 tablet; Refill: 3  -     amLODIPine (NORVASC) 5 MG tablet; Take 1 tablet by mouth Daily.  Dispense: 90 tablet; Refill: 3  -     nebivolol (BYSTOLIC) 5 MG tablet; Take 0.5 tablets by mouth Daily.  Dispense: 15 tablet; Refill: 11  -     Comprehensive Metabolic Panel; Future    3. Hypercholesteremia  -     atorvastatin (LIPITOR) 20 MG tablet; Take 1 tablet by mouth Daily.  Dispense: 90 tablet; Refill: 3  -     Lipid Panel; Future    4. Aortic stenosis, moderate  -     Adult Transthoracic Echo Complete W/ Cont if Necessary Per Protocol; Future  -     proBNP; Future    5. Rheumatoid arthritis involving multiple sites with positive rheumatoid factor  -     Sedimentation Rate; Future  -     Rheumatoid Factor; Future  -     CBC & Differential; Future    6. Chest pressure  -     Stress Test With Myocardial Perfusion One Day; Future  -     High Sensitivity CRP; Future    7. Shortness of breath  -     Adult Transthoracic Echo Complete W/ Cont if Necessary Per Protocol; Future  -     proBNP; Future       At baseline his heart rate is stable but the blood pressure is mildly  elevated.  His BMI is still around 29.  His cardiovascular examination reveals an ejection systolic murmur at the aortic area.    Regarding his ischemic heart disease, he is on aspirin and Effient.  He also takes Bystolic.  I talked with him about the previous stress test done about 2 years ago.  I like to repeat the stress test and if the area of ischemia is bigger, then he may need to undergo repeat cardiac catheterization.  Meanwhile he is advised to take the nitroglycerin as needed.    His blood pressure is elevated.  He is on a calcium channel blocker, beta-blocker and ACE inhibitor.  I increased the dose of lisinopril to 10 mg once a day.  He is advised to have his electrolytes and renal function checked again    He is on moderate dose of Lipitor for his hypercholesterolemia.  I like to have it rechecked and try to keep the LDL less than 70 if possible.  He also need his LFT checked    Regarding his arctic stenosis, it was moderate about 2 years ago.  The murmur seems to be little bit louder and occurring earlier.  I like to repeat the echocardiogram to reevaluate the valve area    Regarding his rheumatoid arthritis he has been followed by the rheumatologist.  He is advised to talk to them about the medication    The chest pressure he is complaining is little worrisome.  Will check his CRP level and a stress test in the form of Lexiscan    His shortness of breath could be related to his lack of mobility.  I like to rule out cardiac cause so we will repeat the echocardiogram.  Will check his BNP level also    Based on the results, further recommendations will be made                  Electronically signed by Millie Rueda MD March 17, 2025 12:18 EDT

## 2025-03-25 DIAGNOSIS — I10 ESSENTIAL HYPERTENSION: ICD-10-CM

## 2025-03-27 DIAGNOSIS — I10 ESSENTIAL HYPERTENSION: ICD-10-CM

## 2025-03-27 DIAGNOSIS — I25.9 IHD (ISCHEMIC HEART DISEASE): ICD-10-CM

## 2025-03-27 DIAGNOSIS — E78.00 HYPERCHOLESTEREMIA: ICD-10-CM

## 2025-03-27 RX ORDER — LISINOPRIL 5 MG/1
5 TABLET ORAL DAILY
Qty: 90 TABLET | Refills: 3 | OUTPATIENT
Start: 2025-03-27

## 2025-03-27 NOTE — TELEPHONE ENCOUNTER
Caller: Pham Haley    Relationship: Emergency Contact    Best call back number: [phone]     Requested Prescriptions:   Requested Prescriptions     Pending Prescriptions Disp Refills    amLODIPine (NORVASC) 5 MG tablet 90 tablet 3     Sig: Take 1 tablet by mouth Daily.    lisinopril (PRINIVIL,ZESTRIL) 10 MG tablet 90 tablet 3     Sig: Take 1 tablet by mouth Daily.    gabapentin (NEURONTIN) 800 MG tablet       Sig: Take 1 tablet by mouth 4 (Four) Times a Day.    nitroglycerin (NITROSTAT) 0.4 MG SL tablet 25 tablet 10     Sig: DISSOLVE 1 TAB UNDER TONGUE FOR CHEST PAIN - IF PAIN REMAINS AFTER 5 MIN, CALL 911 AND REPEAT DOSE. MAX 3 TABS IN 15 MINUTES    atorvastatin (LIPITOR) 20 MG tablet 90 tablet 3     Sig: Take 1 tablet by mouth Daily.    aspirin (Aspirin Low Dose) 81 MG EC tablet 90 tablet 3     Sig: Take 1 tablet by mouth Daily.        Pharmacy where request should be sent:    [facility] [id number] [place] [address] [facility] [address]  - [phone] [facility] [phone] FX [[id number]]       Last office visit with prescribing clinician: 3/17/2025   Last telemedicine visit with prescribing clinician: Visit date not found   Next office visit with prescribing clinician: 9/18/2025     Additional details provided by patient: NA    Does the patient have less than a 3 day supply:  [x] Yes  [] No    Would you like a call back once the refill request has been completed: [x] Yes [] No    If the office needs to give you a call back, can they leave a voicemail: [x] Yes [] No    Pj Esparza Rep   03/27/25 16:27 EDT

## 2025-03-31 ENCOUNTER — HOSPITAL ENCOUNTER (OUTPATIENT)
Dept: CARDIOLOGY | Facility: HOSPITAL | Age: 65
Discharge: HOME | End: 2025-03-31
Payer: COMMERCIAL

## 2025-03-31 ENCOUNTER — LAB (OUTPATIENT)
Dept: LAB | Facility: HOSPITAL | Age: 65
End: 2025-03-31
Payer: COMMERCIAL

## 2025-03-31 VITALS — BODY MASS INDEX: 29.35 KG/M2 | HEIGHT: 70 IN | WEIGHT: 205.03 LBS

## 2025-03-31 DIAGNOSIS — R06.02 SHORTNESS OF BREATH: ICD-10-CM

## 2025-03-31 DIAGNOSIS — I25.9 IHD (ISCHEMIC HEART DISEASE): ICD-10-CM

## 2025-03-31 DIAGNOSIS — I10 ESSENTIAL HYPERTENSION: ICD-10-CM

## 2025-03-31 DIAGNOSIS — E78.00 HYPERCHOLESTEREMIA: ICD-10-CM

## 2025-03-31 DIAGNOSIS — I35.0 AORTIC STENOSIS, MODERATE: ICD-10-CM

## 2025-03-31 DIAGNOSIS — M05.79 RHEUMATOID ARTHRITIS INVOLVING MULTIPLE SITES WITH POSITIVE RHEUMATOID FACTOR: ICD-10-CM

## 2025-03-31 DIAGNOSIS — R07.89 CHEST PRESSURE: ICD-10-CM

## 2025-03-31 LAB
ALBUMIN SERPL-MCNC: 3.9 G/DL (ref 3.5–5.2)
ALBUMIN/GLOB SERPL: 1.2 G/DL
ALP SERPL-CCNC: 50 U/L (ref 39–117)
ALT SERPL W P-5'-P-CCNC: 18 U/L (ref 1–41)
ANION GAP SERPL CALCULATED.3IONS-SCNC: 8.7 MMOL/L (ref 5–15)
AORTIC DIMENSIONLESS INDEX: 0.36 (DI)
AST SERPL-CCNC: 24 U/L (ref 1–40)
AV MEAN PRESS GRAD SYS DOP V1V2: 28.4 MMHG
AV VMAX SYS DOP: 374.5 CM/SEC
BASOPHILS # BLD AUTO: 0.07 10*3/MM3 (ref 0–0.2)
BASOPHILS NFR BLD AUTO: 1.1 % (ref 0–1.5)
BH CV ECHO MEAS - ACS: 0.77 CM
BH CV ECHO MEAS - AO MAX PG: 56.1 MMHG
BH CV ECHO MEAS - AO ROOT DIAM: 2.9 CM
BH CV ECHO MEAS - AO V2 VTI: 88.7 CM
BH CV ECHO MEAS - AVA(I,D): 1.14 CM2
BH CV ECHO MEAS - EDV(CUBED): 60.8 ML
BH CV ECHO MEAS - ESV(CUBED): 23.1 ML
BH CV ECHO MEAS - FS: 27.5 %
BH CV ECHO MEAS - IVS/LVPW: 0.98 CM
BH CV ECHO MEAS - IVSD: 1.02 CM
BH CV ECHO MEAS - LA DIMENSION: 3.7 CM
BH CV ECHO MEAS - LAT PEAK E' VEL: 12.6 CM/SEC
BH CV ECHO MEAS - LV MASS(C)D: 129.7 GRAMS
BH CV ECHO MEAS - LV MAX PG: 5.9 MMHG
BH CV ECHO MEAS - LV MEAN PG: 2.4 MMHG
BH CV ECHO MEAS - LV V1 MAX: 121.1 CM/SEC
BH CV ECHO MEAS - LV V1 VTI: 32.2 CM
BH CV ECHO MEAS - LVIDD: 3.9 CM
BH CV ECHO MEAS - LVIDS: 2.8 CM
BH CV ECHO MEAS - LVOT AREA: 3.1 CM2
BH CV ECHO MEAS - LVOT DIAM: 2 CM
BH CV ECHO MEAS - LVPWD: 1.05 CM
BH CV ECHO MEAS - MED PEAK E' VEL: 7.3 CM/SEC
BH CV ECHO MEAS - MV A MAX VEL: 77.5 CM/SEC
BH CV ECHO MEAS - MV DEC SLOPE: 515.8 CM/SEC2
BH CV ECHO MEAS - MV DEC TIME: 0.25 SEC
BH CV ECHO MEAS - MV E MAX VEL: 98.8 CM/SEC
BH CV ECHO MEAS - MV E/A: 1.28
BH CV ECHO MEAS - MV MAX PG: 3.7 MMHG
BH CV ECHO MEAS - MV MEAN PG: 1.48 MMHG
BH CV ECHO MEAS - MV P1/2T: 62.5 MSEC
BH CV ECHO MEAS - MV V2 VTI: 43.2 CM
BH CV ECHO MEAS - MVA(P1/2T): 3.5 CM2
BH CV ECHO MEAS - MVA(VTI): 2.33 CM2
BH CV ECHO MEAS - PA V2 MAX: 138.1 CM/SEC
BH CV ECHO MEAS - RAP SYSTOLE: 8 MMHG
BH CV ECHO MEAS - RV MAX PG: 2.21 MMHG
BH CV ECHO MEAS - RV V1 MAX: 74.4 CM/SEC
BH CV ECHO MEAS - RV V1 VTI: 20.3 CM
BH CV ECHO MEAS - RVSP: 28.1 MMHG
BH CV ECHO MEAS - SV(LVOT): 100.9 ML
BH CV ECHO MEAS - SVI(LVOT): 47.8 ML/M2
BH CV ECHO MEAS - TAPSE (>1.6): 2.32 CM
BH CV ECHO MEAS - TR MAX PG: 20.1 MMHG
BH CV ECHO MEAS - TR MAX VEL: 224.1 CM/SEC
BH CV ECHO MEASUREMENTS AVERAGE E/E' RATIO: 9.93
BH CV REST NUCLEAR ISOTOPE DOSE: 10 MCI
BH CV STRESS COMMENTS STAGE 1: NORMAL
BH CV STRESS DOSE REGADENOSON STAGE 1: 0.4
BH CV STRESS DURATION MIN STAGE 1: 0
BH CV STRESS DURATION SEC STAGE 1: 10
BH CV STRESS NUCLEAR ISOTOPE DOSE: 30 MCI
BH CV STRESS PROTOCOL 1: NORMAL
BH CV STRESS RECOVERY BP: NORMAL MMHG
BH CV STRESS RECOVERY HR: 77 BPM
BH CV STRESS STAGE 1: 1
BH CV XLRA - TDI S': 10.4 CM/SEC
BILIRUB SERPL-MCNC: 0.3 MG/DL (ref 0–1.2)
BUN SERPL-MCNC: 10 MG/DL (ref 8–23)
BUN/CREAT SERPL: 7.9 (ref 7–25)
CALCIUM SPEC-SCNC: 10.2 MG/DL (ref 8.6–10.5)
CHLORIDE SERPL-SCNC: 108 MMOL/L (ref 98–107)
CHOLEST SERPL-MCNC: 132 MG/DL (ref 0–200)
CO2 SERPL-SCNC: 28.3 MMOL/L (ref 22–29)
CREAT SERPL-MCNC: 1.26 MG/DL (ref 0.76–1.27)
DEPRECATED RDW RBC AUTO: 48.4 FL (ref 37–54)
EGFRCR SERPLBLD CKD-EPI 2021: 63.7 ML/MIN/1.73
EOSINOPHIL # BLD AUTO: 0.37 10*3/MM3 (ref 0–0.4)
EOSINOPHIL NFR BLD AUTO: 5.7 % (ref 0.3–6.2)
ERYTHROCYTE [DISTWIDTH] IN BLOOD BY AUTOMATED COUNT: 13.4 % (ref 12.3–15.4)
ERYTHROCYTE [SEDIMENTATION RATE] IN BLOOD: 18 MM/HR (ref 0–20)
GLOBULIN UR ELPH-MCNC: 3.2 GM/DL
GLUCOSE SERPL-MCNC: 96 MG/DL (ref 65–99)
HCT VFR BLD AUTO: 41.6 % (ref 37.5–51)
HDLC SERPL-MCNC: 33 MG/DL (ref 40–60)
HGB BLD-MCNC: 13.6 G/DL (ref 13–17.7)
IMM GRANULOCYTES # BLD AUTO: 0.02 10*3/MM3 (ref 0–0.05)
IMM GRANULOCYTES NFR BLD AUTO: 0.3 % (ref 0–0.5)
LDLC SERPL CALC-MCNC: 85 MG/DL (ref 0–100)
LDLC/HDLC SERPL: 2.6 {RATIO}
LV EF 3D SEGMENTATION: 54 %
LYMPHOCYTES # BLD AUTO: 1.01 10*3/MM3 (ref 0.7–3.1)
LYMPHOCYTES NFR BLD AUTO: 15.7 % (ref 19.6–45.3)
MAXIMAL PREDICTED HEART RATE: 156 BPM
MCH RBC QN AUTO: 31.9 PG (ref 26.6–33)
MCHC RBC AUTO-ENTMCNC: 32.7 G/DL (ref 31.5–35.7)
MCV RBC AUTO: 97.7 FL (ref 79–97)
MONOCYTES # BLD AUTO: 0.96 10*3/MM3 (ref 0.1–0.9)
MONOCYTES NFR BLD AUTO: 14.9 % (ref 5–12)
NEUTROPHILS NFR BLD AUTO: 4.01 10*3/MM3 (ref 1.7–7)
NEUTROPHILS NFR BLD AUTO: 62.3 % (ref 42.7–76)
NRBC BLD AUTO-RTO: 0 /100 WBC (ref 0–0.2)
NT-PROBNP SERPL-MCNC: 83.3 PG/ML (ref 0–900)
PERCENT MAX PREDICTED HR: 55.13 %
PLATELET # BLD AUTO: 228 10*3/MM3 (ref 140–450)
PMV BLD AUTO: 11.2 FL (ref 6–12)
POTASSIUM SERPL-SCNC: 4.6 MMOL/L (ref 3.5–5.2)
PROT SERPL-MCNC: 7.1 G/DL (ref 6–8.5)
RBC # BLD AUTO: 4.26 10*6/MM3 (ref 4.14–5.8)
SINUS: 2.6 CM
SODIUM SERPL-SCNC: 145 MMOL/L (ref 136–145)
SPECT HRT GATED+EF W RNC IV: 51 %
STRESS BASELINE BP: NORMAL MMHG
STRESS BASELINE HR: 52 BPM
STRESS PERCENT HR: 65 %
STRESS POST ESTIMATED WORKLOAD: 1 METS
STRESS POST EXERCISE DUR MIN: 2 MIN
STRESS POST PEAK BP: NORMAL MMHG
STRESS POST PEAK HR: 86 BPM
STRESS TARGET HR: 133 BPM
TRIGL SERPL-MCNC: 66 MG/DL (ref 0–150)
VLDLC SERPL-MCNC: 14 MG/DL (ref 5–40)
WBC NRBC COR # BLD AUTO: 6.44 10*3/MM3 (ref 3.4–10.8)

## 2025-03-31 PROCEDURE — 80053 COMPREHEN METABOLIC PANEL: CPT

## 2025-03-31 PROCEDURE — 93306 TTE W/DOPPLER COMPLETE: CPT

## 2025-03-31 PROCEDURE — 83880 ASSAY OF NATRIURETIC PEPTIDE: CPT

## 2025-03-31 PROCEDURE — A9500 TC99M SESTAMIBI: HCPCS | Performed by: INTERNAL MEDICINE

## 2025-03-31 PROCEDURE — 85025 COMPLETE CBC W/AUTO DIFF WBC: CPT

## 2025-03-31 PROCEDURE — 93018 CV STRESS TEST I&R ONLY: CPT | Performed by: INTERNAL MEDICINE

## 2025-03-31 PROCEDURE — 86431 RHEUMATOID FACTOR QUANT: CPT

## 2025-03-31 PROCEDURE — 93017 CV STRESS TEST TRACING ONLY: CPT

## 2025-03-31 PROCEDURE — 36415 COLL VENOUS BLD VENIPUNCTURE: CPT

## 2025-03-31 PROCEDURE — 25010000002 REGADENOSON 0.4 MG/5ML SOLUTION: Performed by: INTERNAL MEDICINE

## 2025-03-31 PROCEDURE — 93306 TTE W/DOPPLER COMPLETE: CPT | Performed by: INTERNAL MEDICINE

## 2025-03-31 PROCEDURE — 34310000005 TECHNETIUM SESTAMIBI: Performed by: INTERNAL MEDICINE

## 2025-03-31 PROCEDURE — 78452 HT MUSCLE IMAGE SPECT MULT: CPT | Performed by: INTERNAL MEDICINE

## 2025-03-31 PROCEDURE — 86141 C-REACTIVE PROTEIN HS: CPT

## 2025-03-31 PROCEDURE — 80061 LIPID PANEL: CPT

## 2025-03-31 PROCEDURE — 78452 HT MUSCLE IMAGE SPECT MULT: CPT

## 2025-03-31 PROCEDURE — 85652 RBC SED RATE AUTOMATED: CPT

## 2025-03-31 RX ORDER — REGADENOSON 0.08 MG/ML
0.4 INJECTION, SOLUTION INTRAVENOUS
Status: COMPLETED | OUTPATIENT
Start: 2025-03-31 | End: 2025-03-31

## 2025-03-31 RX ADMIN — REGADENOSON 0.4 MG: 0.08 INJECTION, SOLUTION INTRAVENOUS at 09:52

## 2025-03-31 RX ADMIN — TECHNETIUM TC 99M SESTAMIBI 1 DOSE: 1 INJECTION INTRAVENOUS at 09:52

## 2025-03-31 RX ADMIN — TECHNETIUM TC 99M SESTAMIBI 1 DOSE: 1 INJECTION INTRAVENOUS at 08:09

## 2025-04-01 ENCOUNTER — RESULTS FOLLOW-UP (OUTPATIENT)
Dept: CARDIOLOGY | Facility: CLINIC | Age: 65
End: 2025-04-01
Payer: COMMERCIAL

## 2025-04-01 LAB
CHROMATIN AB SERPL-ACNC: 15 IU/ML (ref 0–14)
CRP SERPL-MCNC: 0.1 MG/DL (ref 0.01–0.5)

## 2025-04-03 RX ORDER — ISOSORBIDE MONONITRATE 30 MG/1
30 TABLET, EXTENDED RELEASE ORAL DAILY
Qty: 90 TABLET | Refills: 2 | Status: SHIPPED | OUTPATIENT
Start: 2025-04-03

## 2025-04-03 RX ORDER — AMLODIPINE BESYLATE 10 MG/1
10 TABLET ORAL DAILY
Qty: 90 TABLET | Refills: 2 | Status: SHIPPED | OUTPATIENT
Start: 2025-04-03

## 2025-04-04 RX ORDER — ATORVASTATIN CALCIUM 20 MG/1
20 TABLET, FILM COATED ORAL DAILY
Qty: 90 TABLET | Refills: 3 | OUTPATIENT
Start: 2025-04-04

## 2025-04-04 RX ORDER — NITROGLYCERIN 0.4 MG/1
TABLET SUBLINGUAL
Qty: 25 TABLET | Refills: 10 | OUTPATIENT
Start: 2025-04-04

## 2025-04-04 RX ORDER — ASPIRIN 81 MG/1
81 TABLET ORAL DAILY
Qty: 90 TABLET | Refills: 3 | OUTPATIENT
Start: 2025-04-04

## 2025-04-04 RX ORDER — LISINOPRIL 10 MG/1
10 TABLET ORAL DAILY
Qty: 90 TABLET | Refills: 3 | OUTPATIENT
Start: 2025-04-04

## 2025-04-04 RX ORDER — GABAPENTIN 800 MG/1
800 TABLET ORAL 4 TIMES DAILY
OUTPATIENT
Start: 2025-04-04

## 2025-04-04 RX ORDER — AMLODIPINE BESYLATE 5 MG/1
5 TABLET ORAL DAILY
Qty: 90 TABLET | Refills: 3 | OUTPATIENT
Start: 2025-04-04

## 2025-07-07 RX ORDER — ISOSORBIDE MONONITRATE 30 MG/1
30 TABLET, EXTENDED RELEASE ORAL DAILY
Qty: 30 TABLET | Refills: 11 | Status: SHIPPED | OUTPATIENT
Start: 2025-07-07

## 2025-07-07 NOTE — TELEPHONE ENCOUNTER
Caller: Pham Haley    Relationship: Emergency Contact    Best call back number: [phone]    Requested Prescriptions:   Requested Prescriptions     Pending Prescriptions Disp Refills    isosorbide mononitrate (IMDUR) 30 MG 24 hr tablet 90 tablet 2     Sig: Take 1 tablet by mouth Daily.        Pharmacy where request should be sent: [facility] [id number] [facility], [place] - [address] [facility] [address]  - [phone]  - [phone]      Last office visit with prescribing clinician: 3/17/2025   Last telemedicine visit with prescribing clinician: Visit date not found   Next office visit with prescribing clinician: 9/18/2025     Does the patient have less than a 3 day supply:  [x] Yes  [] No    Would you like a call back once the refill request has been completed: [] Yes [x] No    If the office needs to give you a call back, can they leave a voicemail: [] Yes [x] No    Pj Haley Rep   07/07/25 10:31 EDT

## 2025-08-11 ENCOUNTER — TELEPHONE (OUTPATIENT)
Dept: CARDIOLOGY | Facility: CLINIC | Age: 65
End: 2025-08-11
Payer: COMMERCIAL